# Patient Record
Sex: MALE
[De-identification: names, ages, dates, MRNs, and addresses within clinical notes are randomized per-mention and may not be internally consistent; named-entity substitution may affect disease eponyms.]

---

## 2017-12-13 ENCOUNTER — HOSPITAL ENCOUNTER (INPATIENT)
Dept: HOSPITAL 31 - C.ER | Age: 52
LOS: 2 days | Discharge: HOME | DRG: 895 | End: 2017-12-15
Attending: PSYCHIATRY & NEUROLOGY | Admitting: PSYCHIATRY & NEUROLOGY
Payer: COMMERCIAL

## 2017-12-13 DIAGNOSIS — F41.9: ICD-10-CM

## 2017-12-13 DIAGNOSIS — F10.230: ICD-10-CM

## 2017-12-13 DIAGNOSIS — F17.210: ICD-10-CM

## 2017-12-13 DIAGNOSIS — F14.11: ICD-10-CM

## 2017-12-13 DIAGNOSIS — F11.23: Primary | ICD-10-CM

## 2017-12-13 LAB
ALBUMIN/GLOB SERPL: 1.5 {RATIO} (ref 1–2.1)
ALP SERPL-CCNC: 56 U/L (ref 38–126)
ALT SERPL-CCNC: 235 U/L (ref 21–72)
AST SERPL-CCNC: 122 U/L (ref 17–59)
BASOPHILS # BLD AUTO: 0.1 K/UL (ref 0–0.2)
BASOPHILS NFR BLD: 1.1 % (ref 0–2)
BILIRUB SERPL-MCNC: 0.6 MG/DL (ref 0.2–1.3)
BILIRUB UR-MCNC: NEGATIVE MG/DL
BUN SERPL-MCNC: 9 MG/DL (ref 9–20)
CALCIUM SERPL-MCNC: 8.2 MG/DL (ref 8.6–10.4)
CHLORIDE SERPL-SCNC: 104 MMOL/L (ref 98–107)
CO2 SERPL-SCNC: 29 MMOL/L (ref 22–30)
EOSINOPHIL # BLD AUTO: 0.5 K/UL (ref 0–0.7)
EOSINOPHIL NFR BLD: 3.6 % (ref 0–4)
ERYTHROCYTE [DISTWIDTH] IN BLOOD BY AUTOMATED COUNT: 13.4 % (ref 11.5–14.5)
ETHANOL SERPL-MCNC: 78 MG/DL (ref 0–10)
GLOBULIN SER-MCNC: 3 GM/DL (ref 2.2–3.9)
GLUCOSE SERPL-MCNC: 131 MG/DL (ref 75–110)
GLUCOSE UR STRIP-MCNC: NORMAL MG/DL
HCT VFR BLD CALC: 42.9 % (ref 35–51)
KETONES UR STRIP-MCNC: NEGATIVE MG/DL
LEUKOCYTE ESTERASE UR-ACNC: (no result) LEU/UL
LYMPHOCYTES # BLD AUTO: 4 K/UL (ref 1–4.3)
LYMPHOCYTES NFR BLD AUTO: 30.8 % (ref 20–40)
MCH RBC QN AUTO: 33.3 PG (ref 27–31)
MCHC RBC AUTO-ENTMCNC: 33.3 G/DL (ref 33–37)
MCV RBC AUTO: 100 FL (ref 80–94)
MONOCYTES # BLD: 1.1 K/UL (ref 0–0.8)
MONOCYTES NFR BLD: 8.6 % (ref 0–10)
NRBC BLD AUTO-RTO: 0 % (ref 0–2)
PH UR STRIP: 5 [PH] (ref 5–8)
PLATELET # BLD: 217 K/UL (ref 130–400)
PMV BLD AUTO: 8.8 FL (ref 7.2–11.7)
POTASSIUM SERPL-SCNC: 4 MMOL/L (ref 3.6–5.2)
PROT SERPL-MCNC: 7.4 G/DL (ref 6.3–8.3)
PROT UR STRIP-MCNC: NEGATIVE MG/DL
RBC # UR STRIP: NEGATIVE /UL
RBC #/AREA URNS HPF: < 1 /HPF (ref 0–3)
SODIUM SERPL-SCNC: 142 MMOL/L (ref 132–148)
SP GR UR STRIP: 1.02 (ref 1–1.03)
UROBILINOGEN UR-MCNC: NORMAL MG/DL (ref 0.2–1)
WBC # BLD AUTO: 12.9 K/UL (ref 4.8–10.8)
WBC #/AREA URNS HPF: 1 /HPF (ref 0–5)

## 2017-12-13 PROCEDURE — HZ56ZZZ INDIVIDUAL PSYCHOTHERAPY FOR SUBSTANCE ABUSE TREATMENT, PSYCHOEDUCATION: ICD-10-PCS | Performed by: PSYCHIATRY & NEUROLOGY

## 2017-12-13 PROCEDURE — HZ59ZZZ INDIVIDUAL PSYCHOTHERAPY FOR SUBSTANCE ABUSE TREATMENT, SUPPORTIVE: ICD-10-PCS | Performed by: PSYCHIATRY & NEUROLOGY

## 2017-12-13 PROCEDURE — HZ52ZZZ INDIVIDUAL PSYCHOTHERAPY FOR SUBSTANCE ABUSE TREATMENT, COGNITIVE-BEHAVIORAL: ICD-10-PCS | Performed by: PSYCHIATRY & NEUROLOGY

## 2017-12-13 PROCEDURE — HZ2ZZZZ DETOXIFICATION SERVICES FOR SUBSTANCE ABUSE TREATMENT: ICD-10-PCS | Performed by: PSYCHIATRY & NEUROLOGY

## 2017-12-13 NOTE — C.PDOC
History Of Present Illness


The patient presents to the ED requesting heroin and alcohol detox. Patient 

states his last heroin use was this morning and last drink was at around 1700 

today. Patient denies suicidal/homicidal ideation at this time. 


Time Seen by Provider: 12/13/17 19:24


Chief Complaint (Nursing): Substance Abuse


History Per: Patient


History/Exam Limitations: no limitations


Onset/Duration Of Symptoms: Hrs


Current Symptoms Are (Timing): Still Present


Suicide/Self Injury Attempted (Context): None


Modifying Factor(s): Alcohol, Narcotics (heroin)


Severity: Mild


Pain Scale Rating Of: 2


Associated Symptoms: denies: Suicidal Thoughts, Suicidal Plan


Involuntary Hold By: None


Recent travel outside of the [place]: No


Additional History Per: Patient





Past Medical History


Reviewed: Historical Data, Nursing Documentation, Vital Signs


Vital Signs: 


 Last Vital Signs











Temp  97.6 F   12/13/17 19:17


 


Pulse  98 H  12/13/17 19:17


 


Resp  16   12/13/17 19:17


 


BP  117/83   12/13/17 19:17


 


Pulse Ox  97   12/13/17 19:51














- Medical History


PMH: Anxiety, Back Problems


Surgical History: No Surg Hx


Family History: States: Unknown Family Hx





- Social History


Hx Tobacco Use: Yes


Hx Alcohol Use: Yes


Hx Substance Use: Yes (Heroin, Methadone)





- Immunization History


Hx Tetanus Toxoid Vaccination: Yes


Hx Influenza Vaccination: No


Hx Pneumococcal Vaccination: No





Review Of Systems


Constitutional: Negative for: Fever, Chills


Cardiovascular: Negative for: Chest Pain, Palpitations


Respiratory: Negative for: Shortness of Breath


Gastrointestinal: Negative for: Nausea, Vomiting, Abdominal Pain


Skin: Negative for: Rash, Lesions, Jaundice, Bruising


Neurological: Negative for: Weakness, Numbness


Psych: Negative for: Suicidal ideation





Physical Exam





- Physical Exam


Appears: Non-toxic, No Acute Distress


Skin: Warm, Dry


Head: Normacephalic


Eye(s): bilateral: Normal Inspection


Oral Mucosa: Moist


Neck: Supple


Chest: Symmetrical, No Deformity, No Tenderness


Cardiovascular: Rhythm Regular, No Murmur


Respiratory: No Rales, No Rhonchi, No Wheezing


Back: Normal Inspection


Extremity: Normal ROM


Extremity: Bilateral: Atraumatic


Neurological/Psych: Oriented x3


Gait: Steady





ED Course And Treatment





- Laboratory Results


Result Diagrams: 


 12/13/17 19:34





 12/13/17 19:34


O2 Sat by Pulse Oximetry: 97 (on RA)


Pulse Ox Interpretation: Normal


Progress Note: Bloodwork and urinalysis ordered.





Disposition


Discussed With Dr.: Alisha Carson


Comment: accepted the pt on his service and took over the care at 9:07 PM


Doctor Will See Patient In The: Hospital


Counseled Patient/Family Regarding: Studies Performed, Diagnosis





- Disposition


Disposition: HOSPITALIZED


Disposition Time: 19:24


Condition: FAIR


Forms:  [facility] (English)





- POA


Present On Arrival: None





- Clinical Impression


Clinical Impression: 


 Alcohol abuse, Drug dependence








- Scribe Statement


The provider has reviewed the documentation as recorded by the Scribe (Nani Luna)


Provider Attestation: 








All medical record entries made by the Scribe were at my direction and 

personally dictated by me. I have reviewed the chart and agree that the record 

accurately reflects my personal performance of the history, physical exam, 

medical decision making, and the department course for this patient. I have 

also personally directed, reviewed, and agree with the discharge instructions 

and disposition.





Decision To Admit





- Pt Status Changed To:


Hospital Disposition Of: Inpatient





- Admit Certification


Admit to Inpatient:: After my assessment, the patient will require 

hospitalization for at least two midnights.  This is because of the severity of 

symptoms shown, intensity of services needed, and/or the medical risk in this 

patient being treated as an outpatient.





- InPatient:


Physician Admission Certification: I certify that this patient requires 2 or 

more midnights of care for the following reason:: After my assessment, the 

patient will require hospitalization for at least two midnights.  This is 

because of the severity of symptoms shown, intensity of services needed, and/or 

the medical risk in this patient being treated as an outpatient.





- .


Bed Request Type: Detox


Admitting Physician: Alisha Carson


Patient Diagnosis: 


 Alcohol abuse, Drug dependence

## 2017-12-14 VITALS — RESPIRATION RATE: 18 BRPM

## 2017-12-14 RX ADMIN — Medication SCH: at 09:27

## 2017-12-14 NOTE — PCM.PSYCH
Initial Psychiatric Evaluation





- Initial Psychiatric Evaluation


Type of Admission: Voluntary


Legal Status: Capacity


Chief Complaint (in patient's own words): 





"I had to stop this"


History of Present Illness and Precipitating Events: 





The patient is seen, chart reviewed and case discussed.


This is a 52-year-old  male, single with no child, unemployed last 7 

months, he gets unemployment money at this point.





The patient drinks 2 packs of beer and a pint of liquor for the last 4 years.


Denies DTs or seizures


He also uses up to 5 bags of heroin "on and off" intranasally for the last 32 

years.


He says he tried methadone in the past and recently to come off he took some 

"sips of methadone"


He was on 90 mg of methadone at [facility] up until 4 years ago.


He was using cocaine but stopped recently


Smokes half pack per day cigarettes


Denies all other drugs.


He was in detox 7 times and rehabilitation 2 times.





Past psych history: He states he had anxiety all his life and still suffers 

from anxiety. No admissions or suicide attempts





Family psych history: Mother had anxiety and depression





Medical history: Denies


Current Medications: 





Active Medications











Generic Name Dose Route Start Last Admin





  Trade Name Freq  PRN Reason Stop Dose Admin


 


Acetaminophen  650 mg  12/13/17 21:33  





  Tylenol 325mg Tab  PO   





  Q4H PRN   





  Fever greater than 101 F   


 


Al Hydrox/Mg Hydrox/Simethicone  30 ml  12/13/17 21:33  





  Maalox 30 Ml  PO   





  TID PRN   





  Indigestion / Heartburn   


 


Clonidine HCl  0.1 mg  12/13/17 21:33  12/13/17 22:56





  Catapres  PO   0.1 mg





  Q8 PRN   Administration





  COWS Score More or Equal to 5   


 


Folic Acid  1 mg  12/14/17 10:00  12/14/17 09:27





  Folic Acid  PO   Not Given





  DAILY MARIA ELENA   


 


Gabapentin  100 mg  12/14/17 10:00  12/14/17 09:28





  Neurontin  PO   Not Given





  TID MARIA ELENA   


 


Hydroxyzine HCl  25 mg  12/13/17 22:26  12/13/17 22:57





  Atarax  PO   25 mg





  Q6 PRN   Administration





  Anxiety   


 


Loperamide HCl  2 mg  12/13/17 21:33  





  Imodium  PO   





  Q8 PRN   





  Diarrhea   


 


Lorazepam  1 mg  12/13/17 21:45  12/14/17 12:35





  Ativan  PO  12/18/17 21:44  1 mg





  Q4 MARIA ELENA   Administration





  Taper   


 


Lorazepam  1 mg  12/13/17 21:34  





  Ativan  PO   





  Q4H PRN   





  Symptoms of alcohol withdrawl   


 


Multivitamins  1 tab  12/14/17 10:00  12/14/17 09:27





  Hexavitamin  PO   Not Given





  DAILY MARIA ELENA   


 


Ondansetron HCl  4 mg  12/13/17 21:33  





  Zofran Tab  PO   





  Q8 PRN   





  Nausea/Vomiting   


 


Thiamine HCl  100 mg  12/14/17 10:00  12/14/17 09:28





  Vitamin B1 Tab  PO   Not Given





  DAILY MARIA ELENA   


 


Trazodone HCl  50 mg  12/13/17 21:34  12/13/17 22:57





  Desyrel  PO   50 mg





  HS PRN   Administration





  Insomnia   














Past Psychiatric History





- Past Psychiatric History


Previous Treatment History: None


Pertinent Medical Hx (Current Medical&Sleep Prob, Allergies): 





 Allergies











Allergy/AdvReac Type Severity Reaction Status Date / Time


 


No Known Allergies Allergy   Verified 12/13/17 19:15








 





No Known Home Med  12/13/17 











Review of Systems





- Neurological


Neurological: UNREMARKABLE





- Psychiatric


Psychiatric: Abnormal Sleep Pattern, Anxiety.  absent: Depression, Difficulty 

Concentrating, Hallucinations, Suicidal Ideation





Mental Status Examination





- Personal Presentation


Personal Presentation: Looks stated age





- Affect


Affect: Constricted





- Motor Activity


Motor Activity: Calm





- Reliability in Providing Information


Reliability in Providing Information: Good





- Speech


Speech: Organized





- Mood


Mood: Anxious





- Formal Thought Process


Formal Thought Process: No Impairment





- Cognitive Functions


Orientation: Person, Place, Situation, Time


Attention/Concentration: Attentive


Estimate of Intelligence: Average


Judgement: Intact, as evidence by: Insight regarding need for hospitalization


Memory: Recent intact, as evidence by: Ability to recall events of the day, 

Remote intact, as evidenced by: Abilit to recall sig. life events





- Risk


Risk: Withdrawal, Diminished functioning





- Strength & Assets Inventory


Strength & Assets Inventory: Cooperative





DSM 5 DX





- DSM 5


DSM 5 Diagnosis: 





Opioid withdrawal


Opioid use d/o - severe


Alcohol use d/o -severe


Alcohol withdrawal


Cocaine use d/o- in early remission


Tobacco use d/o - moderate


Anxiety d/o - unspecified





- Recommended/Plan of Treatment


Treatment Recommendations and Plan of Treatment: 





Ativan detox due to elevated LFTs


Subutex detox if he scores higher


Gabapentin for augmentation


As needed medications


Attend groups and activities


Supportive therapy and psychoeducation


MI for abstinence


CBT for relapse prevention


Encourage MAT


Refer to rehab or IOP, and self-help groups


Smoking cessation with MI


Nicotine patch


34 min


Projected ELOS: 5 days


Prognosis: good w treatment





- Smoking Cessation


Smoking Cessation Initiated: Yes

## 2017-12-14 NOTE — PCM.BM
<OsmaniThaoFarida F - Last Filed: 12/14/17 14:54>





Treatment Plan Problems





- Problems identified on initial assessmt


  ** Risk for alcohol use disorder


Date Initiated: 12/13/17


Time Initiated: 22:00


Assessment reference: NA


Status: Active





  ** Risk for opioid use disorder


Date Initiated: 12/13/17


Time Initiated: 22:00


Assessment reference: NA


Status: Active





Treatment assets and liabiliti


Patient Assests: self-reliant, ADL independent, negotiates basic needs, 

cognitively intact


Patient Liabilities: financial problems, relationship conflicts, substance abuse





- Milieu Protocol


Maintain good personal hygiene: daily Encourage regular showers, daily Remind 

patient to perform daily oral care, daily Assist patient to perform ADL's, 

every shift Encourage regular showers, every shift Remind patient to perform 

daily oral care, every shift Assist patient to perform ADL's


Maintain personal safety: daily Educate patient to report safety concerns to 

staff, daily Monitor environment for contraband/sharps, every shift Educate 

patient to report safety concerns to staff, every shift Monitor environment for 

contraband/sharps


Medication safety: Monitor for expected outcome, potential side effects: daily, 

every shift, Assess barriers to learning: daily, every shift, Assess readiness 

for medication education: daily, every shift


Milieu Narrative: 





Ativan detox due to elevated LFTs


Subutex detox if he scores higher


Gabapentin for augmentation


As needed medications


Attend groups and activities


Supportive therapy and psychoeducation


MI for abstinence


CBT for relapse prevention


Encourage MAT


Refer to rehab or IOP, and self-help groups


Smoking cessation with MI


Nicotine patch


34 min





Family Contact


Family involvement: Famliy/SO not involved





Discharge/Continuing Care





- Treatment Team Participation


Patient/Family/SO Statement: 





Ativan detox due to elevated LFTs


Subutex detox if he scores higher


Gabapentin for augmentation


As needed medications


Attend groups and activities


Supportive therapy and psychoeducation


MI for abstinence


CBT for relapse prevention


Encourage MAT


Refer to rehab or IOP, and self-help groups


Smoking cessation with MI


Nicotine patch


34 min





<David Bustillo - Last Filed: 12/15/17 10:32>





- Diagnosis


(1) Alcohol use disorder, severe, dependence


Status: Acute   


Interventions: 





12/15/17 10:32


* Assess 7x/week regarding severity of withdrawal


* Educate regarding risks, benefits, side effects and alternatives of 

medications


* Use Motivational Interviewing for abstinence


* Use CBT for relapse prevention


* Medication management for withdrawal symptoms


* Encourage medication assisted treatment


*

## 2017-12-15 VITALS
HEART RATE: 108 BPM | TEMPERATURE: 97.9 F | DIASTOLIC BLOOD PRESSURE: 91 MMHG | SYSTOLIC BLOOD PRESSURE: 121 MMHG | OXYGEN SATURATION: 98 %

## 2017-12-15 RX ADMIN — Medication SCH: at 10:25

## 2017-12-15 NOTE — PCM.PYCHDC
Mental Status Examination





- Mental Status Examination


Orientation: Person, Place, Situation, Time


Memory: Intact


Mood: Anxious


Affect: Constricted


Speech: Appropriate


Attention: Poor


Concentration: Poor


Association: WNL


Fund of Knowledge: WNL


Formal Thought Process: No Impairment


Suicidal Ideation: No


Current Homicidal Ideation?: No





Discharge Summary





- Discharge Note


Consultations:: List each consultation separately and include:  1. Reason for 

request.  2. Findings.  3. Follow-up


Summary of Hospital Course include:: 1. Description of specific treatment plan 

utilized for patients during their course of treatmen.  2. Summarize the time-

course for resolution of acute symptoms and/or regressed behaviors.  3. 

Describe issues identified and worked on during hospitalization.  4. Describe 

medication utilized.  5. Describe medical problems identified and treated.  6. 

Reassessment of suicide risk


Summary of Hospital Course: 





The patient is seen, chart reviewed and case discussed.





On admission:


This is a 52-year-old  male, single with no child, unemployed last 7 

months, he gets unemployment money at this point.





The patient drinks 2 packs of beer and a pint of liquor for the last 4 years.


Denies DTs or seizures


He also uses up to 5 bags of heroin "on and off" intranasally for the last 32 

years.


He says he tried methadone in the past and recently to come off he took some 

"sips of methadone"


He was on 90 mg of methadone at [facility] up until 4 years ago.


He was using cocaine but stopped recently


Smokes half pack per day cigarettes


Denies all other drugs.


He was in detox 7 times and rehabilitation 2 times.





Past psych history: He states he had anxiety all his life and still suffers 

from anxiety. No admissions or suicide attempts





Family psych history: Mother had anxiety and depression


Medical history: Denies





Hospital course:


The pt was admitted and started on treatment with psychotherapy, support, 

psychoeducation and medications. 


MI and CBT used briefly.


All the risks and benefits of medications are discussed and the patient 

understood and agreed.


The pt was found to be smoking the next morning and discharged 

administratively. He first denied it but then his room-mate who smoked together 

admitted. They even pried open the room window a little.


He is given recommendations about how to stay clean and go to [facility] (or 

other rehabs)


He refused prescriptions (and doesn't have insurance)








- Final Diagnosis (DSM 5)


Condition upon Discharge: FAIR


DSM 5: 





Opioid withdrawal


Opioid use d/o - severe


Alcohol use d/o -severe


Alcohol withdrawal


Cocaine use d/o- in early remission


Tobacco use d/o - moderate


Anxiety d/o - unspecified


Disposition: HOME/ ROUTINE


Follow-up Treatment Plan: 





Follow after care plan as discussed.


Use relapse prevention skills


Return to ER or call 911 if suicidal, homicidal or symptoms relapse.


Stay away from stress, alcohol and drugs.


See primary doctor regularly and get labs.

## 2018-10-03 ENCOUNTER — HOSPITAL ENCOUNTER (OUTPATIENT)
Dept: HOSPITAL 31 - C.ER | Age: 53
Setting detail: OBSERVATION
LOS: 2 days | Discharge: LEFT BEFORE DISCHARGE | End: 2018-10-05
Attending: INTERNAL MEDICINE | Admitting: INTERNAL MEDICINE
Payer: COMMERCIAL

## 2018-10-03 VITALS — BODY MASS INDEX: 23.6 KG/M2

## 2018-10-03 DIAGNOSIS — E86.0: ICD-10-CM

## 2018-10-03 DIAGNOSIS — K56.7: Primary | ICD-10-CM

## 2018-10-03 DIAGNOSIS — F17.200: ICD-10-CM

## 2018-10-03 DIAGNOSIS — Z53.21: ICD-10-CM

## 2018-10-03 LAB
ALBUMIN SERPL-MCNC: 4.5 G/DL
ALBUMIN/GLOB SERPL: 1.4 {RATIO}
ALT SERPL-CCNC: 24 U/L
AST SERPL-CCNC: 33 U/L
BASOPHILS # BLD AUTO: 0.1 K/UL
BASOPHILS NFR BLD: 1.3 %
BILIRUB UR-MCNC: NEGATIVE MG/DL
BUN SERPL-MCNC: 12 MG/DL
CALCIUM SERPL-MCNC: 9.1 MG/DL
EOSINOPHIL # BLD AUTO: 0.3 K/UL
EOSINOPHIL NFR BLD: 3 %
ERYTHROCYTE [DISTWIDTH] IN BLOOD BY AUTOMATED COUNT: 13.9 %
GFR NON-AFRICAN AMERICAN: > 60
GLUCOSE UR STRIP-MCNC: NORMAL MG/DL
HGB BLD-MCNC: 14.7 G/DL
LEUKOCYTE ESTERASE UR-ACNC: (no result) LEU/UL
LIPASE: 142 U/L
LYMPHOCYTES # BLD AUTO: 3.9 K/UL
LYMPHOCYTES NFR BLD AUTO: 34.3 %
MCH RBC QN AUTO: 33.4 PG
MCHC RBC AUTO-ENTMCNC: 33.7 G/DL
MCV RBC AUTO: 99.3 FL
MONOCYTES # BLD: 1 K/UL
MONOCYTES NFR BLD: 8.3 %
NEUTROPHILS # BLD: 6.1 K/UL
NEUTROPHILS NFR BLD AUTO: 53.1 %
NRBC BLD AUTO-RTO: 0.1 %
PH UR STRIP: 5 [PH]
PLATELET # BLD: 264 K/UL
PMV BLD AUTO: 8.6 FL
PROT UR STRIP-MCNC: NEGATIVE MG/DL
RBC # BLD AUTO: 4.39 MIL/UL
RBC # UR STRIP: NEGATIVE /UL
SP GR UR STRIP: 1.02
UROBILINOGEN UR-MCNC: NORMAL MG/DL
WBC # BLD AUTO: 11.5 K/UL

## 2018-10-03 PROCEDURE — 99285 EMERGENCY DEPT VISIT HI MDM: CPT

## 2018-10-03 PROCEDURE — 80353 DRUG SCREENING COCAINE: CPT

## 2018-10-03 PROCEDURE — 81001 URINALYSIS AUTO W/SCOPE: CPT

## 2018-10-03 PROCEDURE — 80053 COMPREHEN METABOLIC PANEL: CPT

## 2018-10-03 PROCEDURE — 80320 DRUG SCREEN QUANTALCOHOLS: CPT

## 2018-10-03 PROCEDURE — 36415 COLL VENOUS BLD VENIPUNCTURE: CPT

## 2018-10-03 PROCEDURE — 96365 THER/PROPH/DIAG IV INF INIT: CPT

## 2018-10-03 PROCEDURE — 83690 ASSAY OF LIPASE: CPT

## 2018-10-03 PROCEDURE — 80361 OPIATES 1 OR MORE: CPT

## 2018-10-03 PROCEDURE — 80345 DRUG SCREENING BARBITURATES: CPT

## 2018-10-03 PROCEDURE — 96366 THER/PROPH/DIAG IV INF ADDON: CPT

## 2018-10-03 PROCEDURE — 80358 DRUG SCREENING METHADONE: CPT

## 2018-10-03 PROCEDURE — 96361 HYDRATE IV INFUSION ADD-ON: CPT

## 2018-10-03 PROCEDURE — 80349 CANNABINOIDS NATURAL: CPT

## 2018-10-03 PROCEDURE — 96375 TX/PRO/DX INJ NEW DRUG ADDON: CPT

## 2018-10-03 PROCEDURE — 85025 COMPLETE CBC W/AUTO DIFF WBC: CPT

## 2018-10-03 PROCEDURE — 80346 BENZODIAZEPINES1-12: CPT

## 2018-10-03 PROCEDURE — 83992 ASSAY FOR PHENCYCLIDINE: CPT

## 2018-10-03 PROCEDURE — 96372 THER/PROPH/DIAG INJ SC/IM: CPT

## 2018-10-03 PROCEDURE — 74177 CT ABD & PELVIS W/CONTRAST: CPT

## 2018-10-03 PROCEDURE — 96376 TX/PRO/DX INJ SAME DRUG ADON: CPT

## 2018-10-03 PROCEDURE — 80324 DRUG SCREEN AMPHETAMINES 1/2: CPT

## 2018-10-03 NOTE — C.PDOC
History Of Present Illness


53 year old male presents to the ED c/o right flank pain that started tonight. 

Patient states his pain is persistent and does not change with movement or deep 

breathing. Patient denies fever, chills, nausea, vomit, diarrhea, dysuria, 

hematuria. 


Chief Complaint (Nursing): Abdominal Pain


History Per: Patient


History/Exam Limitations: no limitations


Onset/Duration Of Symptoms: Hrs


Current Symptoms Are (Timing): Still Present


Location Of Pain/Discomfort: Diffuse


Radiation Of Pain To:: Flank


Quality Of Discomfort: "Pain"


Associated Symptoms: denies: Nausea, Vomiting, Diarrhea, Urinary Symptoms


Exacerbating Factors: None


Alleviating Factors: None


Recent travel outside of the [place]: No


Additional History Per: Patient





Past Medical History


Reviewed: Historical Data, Nursing Documentation, Vital Signs


Vital Signs: 





                                Last Vital Signs











Temp  97.7 F   10/03/18 20:56


 


Pulse  99 H  10/03/18 20:56


 


Resp  16   10/03/18 20:56


 


BP  131/85   10/03/18 20:56


 


Pulse Ox  98   10/03/18 20:56














- Medical History


PMH: Anxiety, Back Problems


   Denies: Diabetes, Hepatitis, HIV, HTN, Seizures, Sexually Transmitted Disease


Surgical History: No Surg Hx





- [facility] Procedures











DETOXIFICATION SERVICES FOR SUBSTANCE ABUSE TREATMENT (12/13/17)


[facility]IV PSYCHOTHERAPY FOR SUBSTANCE ABUSE TREATMENT, SUPPORT (12/13/17)


[facility] PSYCHOTHERAPY FOR SUBSTANCE ABUSE, COGNITIV BEHAVIORAL (12/13/17)


[facility] PSYCHOTHERAPY FOR SUBSTANCE ABUSE, PSYCHOEDUCATION (12/13/17)








Family History: States: Unknown Family Hx





- Social History


Hx Tobacco Use: Yes


Hx Alcohol Use: Yes


Hx Substance Use: Yes





- Immunization History


Hx Tetanus Toxoid Vaccination: Yes


Hx Influenza Vaccination: No


Hx Pneumococcal Vaccination: No





Review Of Systems


Constitutional: Negative for: Fever, Chills


Cardiovascular: Negative for: Chest Pain


Respiratory: Negative for: Shortness of Breath


Gastrointestinal: Negative for: Nausea, Vomiting


Genitourinary: Negative for: Dysuria, Hematuria


Musculoskeletal: Positive for: Back Pain


Neurological: Negative for: Weakness, Numbness





Physical Exam





- Physical Exam


Appears: Non-toxic, No Acute Distress


Skin: Normal Color, Warm, Dry


Head: Atraumatic, Normacephalic


Eye(s): bilateral: Normal Inspection


Oral Mucosa: Moist


Neck: Normal ROM, Supple


Chest: Symmetrical


Cardiovascular: Rhythm Regular


Respiratory: Normal Breath Sounds, No Rales, No Rhonchi, No Wheezing


Gastrointestinal/Abdominal: Soft, No Tenderness, No Guarding, No Rebound


Back: CVA Tenderness (right)


Extremity: Normal ROM, No Tenderness, No Swelling


Neurological/Psych: Oriented x3, Normal Speech, Normal Cognition


Gait: Steady





ED Course And Treatment





- Laboratory Results


Result Diagrams: 


                                 10/03/18 22:33





                                 10/03/18 22:33


O2 Sat by Pulse Oximetry: 98 (ON RA)


Pulse Ox Interpretation: Normal





- CT Scan/US


  ** CT abd/pelvis


Other Rad Studies (CT/US): Read By Radiologist, Radiology Report Reviewed


CT/US Interpretation: CT SCAN OF THE ABDOMEN AND PELVIS WITH CONTRAST.  CLINICAL

HISTORY: Abdominal pain.  TECHNIQUE: Multiple axial and coronal CT images were 

obtained through the abdomen and pelvis after administration of intravenous 

contrast material.  COMMENTS:  Fluid filled the small bowels in the left lower 

quadrant.  The liver is of uniform attenuation without mass or defect. There is 

no intra or extrahepatic biliary ductal dilatation. The spleen is normal. The 

gallbladder is within normal limits. The pancreas is of normal contour and 

attenuation characteristics. There is no evidence of adrenal mass.  

Uncomplicated colonic diverticulosis.  Both kidneys demonstrate prompt and equal

nephrograms. The kidneys are normal in size, shape and configuration. There is 

no evidence of renal or ureteral mass. No renal or ureteral calculi are 

identified. There is no hydroureter or hydronephrosis.  No evidence for 

appendicitis. There is no bowel wall thickening. No evidence for small or large 

bowel obstruction. There is no evidence of abdominal ascites or lymphadenopathy.

 There is no evidence of intrinsic or extrinsic bladder mass. There is no pelvic

ascites or lymphadenopathy.  Images of the lung bases show no evidence of 

pleural or parenchymal mass. There are no pleural effusions. The bony structures

are free of lytic or blastic lesions.  IMPRESSION:  Fluid filled small bowel to 

the left lower quadrant. Nonspecific finding. Probably mild ileus.  Thank you 

for your kind referral of this patient.  .  Electronically signed on Oct 4, 2018

12:42:51 AM EDT by:  Ale Wynn M.D., Certified by ABR, MSK, 

Neuroradiology





Medical Decision Making


Medical Decision Making: 


Plan:


* CT abd/pelvis


* Labs


* IV fluids


* Toradol 30 mg IVP


* UA








Disposition


Discussed With : Aba Baird


Doctor Will See Patient In The: Hospital


Counseled Patient/Family Regarding: Diagnosis





- Disposition


Disposition: HOSPITALIZED


Disposition Time: 01:21


Condition: STABLE


Forms:  [facility] (English)





- POA


Present On Arrival: None





- Clinical Impression


Clinical Impression: 


 Abdominal pain, Ileus








- Scribe Statement


The provider has reviewed the documentation as recorded by the Scribe


Gold [place]





All medical record entries made by the Scribe were at my direction and 

personally dictated by me. I have reviewed the chart and agree that the record 

accurately reflects my personal performance of the history, physical exam, me

dical decision making, and the department course for this patient. I have also 

personally directed, reviewed, and agree with the discharge instructions and 

disposition.

## 2018-10-04 VITALS — RESPIRATION RATE: 20 BRPM

## 2018-10-04 RX ADMIN — WATER SCH MLS/HR: 1 INJECTION INTRAMUSCULAR; INTRAVENOUS; SUBCUTANEOUS at 14:12

## 2018-10-04 RX ADMIN — WATER SCH MLS/HR: 1 INJECTION INTRAMUSCULAR; INTRAVENOUS; SUBCUTANEOUS at 06:11

## 2018-10-04 RX ADMIN — DEXTROSE AND SODIUM CHLORIDE SCH: 5; 450 INJECTION, SOLUTION INTRAVENOUS at 13:30

## 2018-10-04 RX ADMIN — DEXTROSE AND SODIUM CHLORIDE SCH MLS/HR: 5; 450 INJECTION, SOLUTION INTRAVENOUS at 02:15

## 2018-10-04 RX ADMIN — DEXTROSE AND SODIUM CHLORIDE SCH MLS/HR: 5; 450 INJECTION, SOLUTION INTRAVENOUS at 06:18

## 2018-10-04 RX ADMIN — WATER SCH MLS/HR: 1 INJECTION INTRAMUSCULAR; INTRAVENOUS; SUBCUTANEOUS at 21:52

## 2018-10-04 NOTE — CT
Date of service: 



10/03/2018



PROCEDURE:  CT Abdomen and Pelvis with contrast



HISTORY:

left flank pain



COMPARISON:

None.



TECHNIQUE:

Intravenous contrast dose: 100 cc Visipaque 320.



Radiation dose:



Total exam DLP = 333.01 mGy-cm.



This CT exam was performed using one or more of the following dose 

reduction techniques: Automated exposure control, adjustment of the 

mA and/or kV according to patient size, and/or use of iterative 

reconstruction technique.



FINDINGS:



LOWER THORAX:

Unremarkable. 



LIVER:

Unremarkable. No gross lesion or ductal dilatation. 



GALLBLADDER AND BILE DUCTS:

Unremarkable. 



PANCREAS:

Unremarkable. No gross lesion or ductal dilatation.



SPLEEN:

Unremarkable. 



ADRENALS:

Unremarkable. No mass. 



KIDNEYS AND URETERS:

Unremarkable. No hydronephrosis. No solid mass. 



VASCULATURE:

Unremarkable. No aortic aneurysm. 



BOWEL:

Unremarkable. No obstruction. No gross mural thickening. 



APPENDIX:

No abnormalities to suggest acute appendicitis. No right lower 

quadrant inflammatory processes identified. 



PERITONEUM:

Unremarkable. No free fluid. No free air. 



LYMPH NODES:

Unremarkable. No enlarged lymph nodes. 



BLADDER:

Unremarkable. 



REPRODUCTIVE:

Unremarkable. 



BONES:

No acute fracture. 



OTHER FINDINGS:

None.



IMPRESSION:

No acute findings related to/accounting  for the clinical 

presentation.



Additional benign and/or incidental findings described above.



________________________________________________



Concordant results (preliminary interpretation) provided by [facility].



Procedure Completed: 23:54



Preliminary Report: Dictated and Authenticated: 12:42.



Final Interpretation: 10:04. October 3, 2018

## 2018-10-04 NOTE — CP.PCM.HP
Past Patient History





- Infectious Disease


Hx of Infectious Diseases: None





- Past Medical History & Family History


Past Medical History?: No





- Past Social History


Smoking Status: Heavy Smoker > 10 Cigarettes Daily





- CARDIAC


Hx Hypertension: No





- PULMONARY


Hx Tuberculosis: No





- NEUROLOGICAL


Hx Seizures: No





- ENDOCRINE/METABOLIC


Hx Endocrine Disorders: No





- HEMATOLOGICAL/ONCOLOGICAL


Hx Human Immunodeficiency Virus (HIV): No





- MUSCULOSKELETAL/RHEUMATOLOGICAL


Hx Falls: No





- GENITOURINARY/GYNECOLOGICAL


Hx Sexually Transmitted Disorders: No





- PSYCHIATRIC


Hx Anxiety: Yes


Hx Substance Use: Yes





- SURGICAL HISTORY


Hx Surgeries: Yes


Hx Musculoskeletal Surgery: Yes


Other/Comment: knee sx, torn cartilage





- ANESTHESIA


Hx Anesthesia: Yes


Hx Anesthesia Reactions: No





Meds


Allergies/Adverse Reactions: 


                                    Allergies











Allergy/AdvReac Type Severity Reaction Status Date / Time


 


No Known Allergies Allergy   Verified 12/13/17 19:15














Results





- Vital Signs


Recent Vital Signs: 





                                Last Vital Signs











Temp  97.2 F L  10/04/18 15:00


 


Pulse  82   10/04/18 15:00


 


Resp  20   10/04/18 15:00


 


BP  139/88   10/04/18 15:00


 


Pulse Ox  96   10/04/18 15:00














- Labs


Result Diagrams: 


                                 10/03/18 22:33





                                 10/03/18 22:33


Labs: 





                         Laboratory Results - last 24 hr











  10/03/18 10/03/18 10/03/18





  22:33 22:33 22:33


 


WBC  11.5 H  


 


RBC  4.39 L  


 


Hgb  14.7  


 


Hct  43.5  


 


MCV  99.3 H  


 


MCH  33.4 H  


 


MCHC  33.7  


 


RDW  13.9  


 


Plt Count  264  


 


MPV  8.6  


 


Neut % (Auto)  53.1  


 


Lymph % (Auto)  34.3  


 


Mono % (Auto)  8.3  


 


Eos % (Auto)  3.0  


 


Baso % (Auto)  1.3  


 


Neut # (Auto)  6.1  


 


Lymph # (Auto)  3.9  


 


Mono # (Auto)  1.0 H  


 


Eos # (Auto)  0.3  


 


Baso # (Auto)  0.1  


 


Sodium    145


 


Potassium    3.9


 


Chloride    107


 


Carbon Dioxide    26


 


Anion Gap    17


 


BUN    12


 


Creatinine    0.7 L


 


Est GFR ( Amer)    > 60


 


Est GFR (Non-Af Amer)    > 60


 


Random Glucose    94


 


Calcium    9.1


 


Total Bilirubin    0.5


 


AST    33


 


ALT    24


 


Alkaline Phosphatase    77


 


Total Protein    7.8


 


Albumin    4.5


 


Globulin    3.3


 


Albumin/Globulin Ratio    1.4


 


Lipase    142


 


Urine Color   Yellow 


 


Urine Clarity   Clear 


 


Urine pH   5.0 


 


Ur Specific Gravity   1.019 


 


Urine Protein   Negative 


 


Urine Glucose (UA)   Normal 


 


Urine Ketones   Negative 


 


Urine Blood   Negative 


 


Urine Nitrate   Negative 


 


Urine Bilirubin   Negative 


 


Urine Urobilinogen   Normal 


 


Ur Leukocyte Esterase   Neg 


 


Urine WBC (Auto)   < 1 


 


Urine RBC (Auto)   < 1 


 


Urine Opiates Screen   


 


Urine Methadone Screen   


 


Ur Barbiturates Screen   


 


Ur Phencyclidine Scrn   


 


Ur Amphetamines Screen   


 


U Benzodiazepines Scrn   


 


U Oth Cocaine Metabols   


 


U Cannabinoids Screen   


 


Alcohol, Quantitative   














  10/04/18 10/04/18





  16:50 19:18


 


WBC  


 


RBC  


 


Hgb  


 


Hct  


 


MCV  


 


MCH  


 


MCHC  


 


RDW  


 


Plt Count  


 


MPV  


 


Neut % (Auto)  


 


Lymph % (Auto)  


 


Mono % (Auto)  


 


Eos % (Auto)  


 


Baso % (Auto)  


 


Neut # (Auto)  


 


Lymph # (Auto)  


 


Mono # (Auto)  


 


Eos # (Auto)  


 


Baso # (Auto)  


 


Sodium  


 


Potassium  


 


Chloride  


 


Carbon Dioxide  


 


Anion Gap  


 


BUN  


 


Creatinine  


 


Est GFR ( Amer)  


 


Est GFR (Non-Af Amer)  


 


Random Glucose  


 


Calcium  


 


Total Bilirubin  


 


AST  


 


ALT  


 


Alkaline Phosphatase  


 


Total Protein  


 


Albumin  


 


Globulin  


 


Albumin/Globulin Ratio  


 


Lipase  


 


Urine Color  


 


Urine Clarity  


 


Urine pH  


 


Ur Specific Gravity  


 


Urine Protein  


 


Urine Glucose (UA)  


 


Urine Ketones  


 


Urine Blood  


 


Urine Nitrate  


 


Urine Bilirubin  


 


Urine Urobilinogen  


 


Ur Leukocyte Esterase  


 


Urine WBC (Auto)  


 


Urine RBC (Auto)  


 


Urine Opiates Screen   Negative


 


Urine Methadone Screen   Negative


 


Ur Barbiturates Screen   Negative


 


Ur Phencyclidine Scrn   Negative


 


Ur Amphetamines Screen   Negative


 


U Benzodiazepines Scrn   Negative


 


U Oth Cocaine Metabols   Negative


 


U Cannabinoids Screen   Negative


 


Alcohol, Quantitative  < 10

## 2018-10-05 VITALS
OXYGEN SATURATION: 97 % | SYSTOLIC BLOOD PRESSURE: 120 MMHG | DIASTOLIC BLOOD PRESSURE: 81 MMHG | HEART RATE: 81 BPM | TEMPERATURE: 97.8 F

## 2018-10-05 RX ADMIN — WATER SCH MLS/HR: 1 INJECTION INTRAMUSCULAR; INTRAVENOUS; SUBCUTANEOUS at 05:33

## 2018-10-05 RX ADMIN — DEXTROSE AND SODIUM CHLORIDE SCH: 5; 450 INJECTION, SOLUTION INTRAVENOUS at 00:29

## 2018-10-05 NOTE — CP.PCM.DIS
Provider





- Provider


Date of Admission: 


10/04/18 01:35





Attending physician: 


Aba Baird MD








Hospital Course





- Lab Results


Lab Results: 


                             Most Recent Lab Values











WBC  11.5 K/uL (4.8-10.8)  H  10/03/18  22:33    


 


RBC  4.39 Mil/uL (4.40-5.90)  L  10/03/18  22:33    


 


Hgb  14.7 g/dL (12.0-18.0)   10/03/18  22:33    


 


Hct  43.5 % (35.0-51.0)   10/03/18  22:33    


 


MCV  99.3 fL (80.0-94.0)  H  10/03/18  22:33    


 


MCH  33.4 pg (27.0-31.0)  H  10/03/18  22:33    


 


MCHC  33.7 g/dL (33.0-37.0)   10/03/18  22:33    


 


RDW  13.9 % (11.5-14.5)   10/03/18  22:33    


 


Plt Count  264 K/uL (130-400)   10/03/18  22:33    


 


MPV  8.6 fL (7.2-11.7)   10/03/18  22:33    


 


Neut % (Auto)  53.1 % (50.0-75.0)   10/03/18  22:33    


 


Lymph % (Auto)  34.3 % (20.0-40.0)   10/03/18  22:33    


 


Mono % (Auto)  8.3 % (0.0-10.0)   10/03/18  22:33    


 


Eos % (Auto)  3.0 % (0.0-4.0)   10/03/18  22:33    


 


Baso % (Auto)  1.3 % (0.0-2.0)   10/03/18  22:33    


 


Neut # (Auto)  6.1 K/uL (1.8-7.0)   10/03/18  22:33    


 


Lymph # (Auto)  3.9 K/uL (1.0-4.3)   10/03/18  22:33    


 


Mono # (Auto)  1.0 K/uL (0.0-0.8)  H  10/03/18  22:33    


 


Eos # (Auto)  0.3 K/uL (0.0-0.7)   10/03/18  22:33    


 


Baso # (Auto)  0.1 K/uL (0.0-0.2)   10/03/18  22:33    


 


Sodium  145 mmol/L (132-148)   10/03/18  22:33    


 


Potassium  3.9 mmol/L (3.6-5.2)   10/03/18  22:33    


 


Chloride  107 mmol/L ()   10/03/18  22:33    


 


Carbon Dioxide  26 mmol/L (22-30)   10/03/18  22:33    


 


Anion Gap  17  (10-20)   10/03/18  22:33    


 


BUN  12 mg/dL (9-20)   10/03/18  22:33    


 


Creatinine  0.7 mg/dL (0.8-1.5)  L  10/03/18  22:33    


 


Est GFR ( Amer)  > 60   10/03/18  22:33    


 


Est GFR (Non-Af Amer)  > 60   10/03/18  22:33    


 


Random Glucose  94 mg/dL ()   10/03/18  22:33    


 


Calcium  9.1 mg/dl (8.6-10.4)   10/03/18  22:33    


 


Total Bilirubin  0.5 mg/dL (0.2-1.3)   10/03/18  22:33    


 


AST  33 U/L (17-59)   10/03/18  22:33    


 


ALT  24 U/L (21-72)   10/03/18  22:33    


 


Alkaline Phosphatase  77 U/L ()   10/03/18  22:33    


 


Total Protein  7.8 g/dL (6.3-8.3)   10/03/18  22:33    


 


Albumin  4.5 g/dL (3.5-5.0)   10/03/18  22:33    


 


Globulin  3.3 gm/dL (2.2-3.9)   10/03/18  22:33    


 


Albumin/Globulin Ratio  1.4  (1.0-2.1)   10/03/18  22:33    


 


Lipase  142 U/L ()   10/03/18  22:33    


 


Urine Color  Yellow  (YELLOW)   10/03/18  22:33    


 


Urine Clarity  Clear  (Clear)   10/03/18  22:33    


 


Urine pH  5.0  (5.0-8.0)   10/03/18  22:33    


 


Ur Specific Gravity  1.019  (1.003-1.030)   10/03/18  22:33    


 


Urine Protein  Negative mg/dL (NEGATIVE)   10/03/18  22:33    


 


Urine Glucose (UA)  Normal mg/dL (Normal)   10/03/18  22:33    


 


Urine Ketones  Negative mg/dL (NEGATIVE)   10/03/18  22:33    


 


Urine Blood  Negative  (NEGATIVE)   10/03/18  22:33    


 


Urine Nitrate  Negative  (NEGATIVE)   10/03/18  22:33    


 


Urine Bilirubin  Negative  (NEGATIVE)   10/03/18  22:33    


 


Urine Urobilinogen  Normal mg/dL (0.2-1.0)   10/03/18  22:33    


 


Ur Leukocyte Esterase  Neg Geovanna/uL (Negative)   10/03/18  22:33    


 


Urine WBC (Auto)  < 1 /hpf (0-5)   10/03/18  22:33    


 


Urine RBC (Auto)  < 1 /hpf (0-3)   10/03/18  22:33    


 


Urine Opiates Screen  Negative  (NEGATIVE)   10/04/18  19:18    


 


Urine Methadone Screen  Negative  (NEGATIVE)   10/04/18  19:18    


 


Ur Barbiturates Screen  Negative  (NEGATIVE)   10/04/18  19:18    


 


Ur Phencyclidine Scrn  Negative  (NEGATIVE)   10/04/18  19:18    


 


Ur Amphetamines Screen  Negative  (NEGATIVE)   10/04/18  19:18    


 


U Benzodiazepines Scrn  Negative  (NEGATIVE)   10/04/18  19:18    


 


U Oth Cocaine Metabols  Negative  (NEGATIVE)   10/04/18  19:18    


 


U Cannabinoids Screen  Negative  (NEGATIVE)   10/04/18  19:18    


 


Alcohol, Quantitative  < 10 mg/dl (0-10)   10/04/18  16:50    














Discharge Plan





- Follow Up Plan


Condition: STABLE


Disposition: AGAINST MEDICAL ADVICE

## 2018-10-05 NOTE — HP
CHIEF COMPLAINT:  Right upper quadrant abdominal pain.



HISTORY OF PRESENT ILLNESS:  This is a 53-year-old white male.  He smokes,

and he drinks.  He denies any substance abuse.  He has history of hepatitis

C which was treated for two months with the medication by his hepatologist,

and the patient was told that he has been cured from hepatitis C.  The

patient is in his usual state of health.  He is ambulatory, and independent

in activities of daily living.  He drinks.  At present, came in because for

last three days, he has been having right upper quadrant pain around right

flank area going to the back, associated with nausea.  No vomiting.  No

diarrhea.  The patient denies any fever or chills.  The patient denies

dysuria, hematuria, or pyuria.  He denies any history of frequency of

urination.  He denies any history of polyuria, polydipsia, or polyphagia. 

He denies any history of cough, sore throat, or runny nose.  He denies any

sneezing.  He denies any history of bruising.  He denies any history of

knee pain or hip pain.  He denies any history of rash.



SOCIAL HISTORY:  He smokes.  He drinks.  He denies drugs.



FAMILY HISTORY:  Noncontributory.



CURRENT MEDICATIONS:  At home, the patient is not on any medications.



ALLERGIES:  UNKNOWN ALLERGIES.



PHYSICAL EXAMINATION:

GENERAL:  A middle-aged male in no acute distress.

VITAL SIGNS:  Blood pressure 144/84, pulse 89, respiratory rate 20, and

temperature 97.2.

SKIN:  Flushed.  No bruises.  No purpura.  No petechiae.

HEENT:  Atraumatic and normocephalic.  Negative pallor.  Negative jaundice.

Extraocular movements are intact.

NECK:  Supple.  No JVD.  No lymph node.  No thyromegaly.  No carotid bruit.

CHEST WALL:  Bilaterally symmetrical expansion.  No masses.

LUNGS:  Bilaterally clear.  No rales.  No rhonchi.

CVS:  PMI not localized.  S1 and S2 regular.  No heave.  No thrill.

ABDOMEN:  Soft and nontender.  Bowel sounds are positive.  No masses.  No

guarding.  No tenderness.  No rigidity.  No visible peristalsis.

RECTAL:  Negative.

EXTREMITIES:  No clubbing, cyanosis, or edema.

CNS:  Awake, alert, and oriented x3.



ASSESSMENT:

1.  Right upper quadrant pain, could be costochondritis, could be right

lower lobe pneumonia, could be due to hepatitis C, could be gastritis

versus peptic ulcer disease.

2.  Dehydration.



PLAN:  Admit.  Detailed orders written.  Seen and examined.





__________________________________________

Aba Baird MD





DD:  10/04/2018 22:31:28

DT:  10/05/2018 2:52:50

Job # [id number]

## 2018-10-08 NOTE — DS
ADMISSION DIAGNOSIS:  Abdominal pain.



DISCHARGE DIAGNOSIS:  Abdominal pain, etiology unknown.



This is a 53-year-old male with a history of hepatitis C which was treated,

and he was stabilized and has been doing well.  He developed right upper

quadrant abdominal pain radiating posteriorly.  The patient underwent a CT

of abdomen and pelvis with IV and oral contrast, and the CAT scan was

essentially negative for any acute pathology.  The patient was under

treatment.  GI evaluation was called in.  While workup was in progress, the

patient absconded and he left the floor.





__________________________________________

Aba Baird MD



DD:  10/05/2018 23:44:08

DT:  10/06/2018 4:16:35

Job # [id number]

## 2018-11-15 ENCOUNTER — HOSPITAL ENCOUNTER (INPATIENT)
Dept: HOSPITAL 31 - C.ER | Age: 53
LOS: 5 days | Discharge: HOME | DRG: 745 | End: 2018-11-20
Attending: PSYCHIATRY & NEUROLOGY | Admitting: PSYCHIATRY & NEUROLOGY
Payer: COMMERCIAL

## 2018-11-15 VITALS — BODY MASS INDEX: 23.6 KG/M2

## 2018-11-15 DIAGNOSIS — F10.230: Primary | ICD-10-CM

## 2018-11-15 DIAGNOSIS — F17.210: ICD-10-CM

## 2018-11-15 DIAGNOSIS — F41.1: ICD-10-CM

## 2018-11-15 DIAGNOSIS — F11.10: ICD-10-CM
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
## 2018-11-15 LAB
ALBUMIN SERPL-MCNC: 4.5 G/DL (ref 3.5–5)
ALBUMIN/GLOB SERPL: 1.4 {RATIO} (ref 1–2.1)
ALT SERPL-CCNC: 26 U/L (ref 21–72)
AST SERPL-CCNC: 31 U/L (ref 17–59)
BASOPHILS # BLD AUTO: 0.1 K/UL (ref 0–0.2)
BASOPHILS NFR BLD: 0.8 % (ref 0–2)
BILIRUB UR-MCNC: NEGATIVE MG/DL
BUN SERPL-MCNC: 10 MG/DL (ref 9–20)
CALCIUM SERPL-MCNC: 9.7 MG/DL (ref 8.6–10.4)
EOSINOPHIL # BLD AUTO: 0.1 K/UL (ref 0–0.7)
EOSINOPHIL NFR BLD: 0.5 % (ref 0–4)
ERYTHROCYTE [DISTWIDTH] IN BLOOD BY AUTOMATED COUNT: 13.7 % (ref 11.5–14.5)
GFR NON-AFRICAN AMERICAN: > 60
GLUCOSE UR STRIP-MCNC: NORMAL MG/DL
HGB BLD-MCNC: 16.3 G/DL (ref 12–18)
LEUKOCYTE ESTERASE UR-ACNC: (no result) LEU/UL
LYMPHOCYTES # BLD AUTO: 2.3 K/UL (ref 1–4.3)
LYMPHOCYTES NFR BLD AUTO: 16.7 % (ref 20–40)
MCH RBC QN AUTO: 33.6 PG (ref 27–31)
MCHC RBC AUTO-ENTMCNC: 33.6 G/DL (ref 33–37)
MCV RBC AUTO: 99.8 FL (ref 80–94)
MONOCYTES # BLD: 0.8 K/UL (ref 0–0.8)
MONOCYTES NFR BLD: 5.7 % (ref 0–10)
NEUTROPHILS # BLD: 10.3 K/UL (ref 1.8–7)
NEUTROPHILS NFR BLD AUTO: 76.3 % (ref 50–75)
NRBC BLD AUTO-RTO: 0 % (ref 0–2)
PH UR STRIP: 6 [PH] (ref 5–8)
PLATELET # BLD: 310 K/UL (ref 130–400)
PMV BLD AUTO: 9.4 FL (ref 7.2–11.7)
PROT UR STRIP-MCNC: NEGATIVE MG/DL
RBC # BLD AUTO: 4.85 MIL/UL (ref 4.4–5.9)
RBC # UR STRIP: NEGATIVE /UL
SP GR UR STRIP: 1.01 (ref 1–1.03)
SQUAMOUS EPITHIAL: < 1 /HPF (ref 0–5)
UROBILINOGEN UR-MCNC: NORMAL MG/DL (ref 0.2–1)
WBC # BLD AUTO: 13.5 K/UL (ref 4.8–10.8)

## 2018-11-15 PROCEDURE — HZ81ZZZ MEDICATION MANAGEMENT FOR SUBSTANCE ABUSE TREATMENT, METHADONE MAINTENANCE: ICD-10-PCS | Performed by: PSYCHIATRY & NEUROLOGY

## 2018-11-15 PROCEDURE — GZ3ZZZZ MEDICATION MANAGEMENT: ICD-10-PCS | Performed by: PSYCHIATRY & NEUROLOGY

## 2018-11-15 PROCEDURE — HZ59ZZZ INDIVIDUAL PSYCHOTHERAPY FOR SUBSTANCE ABUSE TREATMENT, SUPPORTIVE: ICD-10-PCS | Performed by: PSYCHIATRY & NEUROLOGY

## 2018-11-15 PROCEDURE — HZ46ZZZ GROUP COUNSELING FOR SUBSTANCE ABUSE TREATMENT, PSYCHOEDUCATION: ICD-10-PCS | Performed by: PSYCHIATRY & NEUROLOGY

## 2018-11-15 PROCEDURE — HZ2ZZZZ DETOXIFICATION SERVICES FOR SUBSTANCE ABUSE TREATMENT: ICD-10-PCS | Performed by: PSYCHIATRY & NEUROLOGY

## 2018-11-15 PROCEDURE — HZ80ZZZ MEDICATION MANAGEMENT FOR SUBSTANCE ABUSE TREATMENT, NICOTINE REPLACEMENT: ICD-10-PCS | Performed by: PSYCHIATRY & NEUROLOGY

## 2018-11-15 NOTE — C.PDOC
History Of Present Illness


54 y/o male presents to ED requesting detox from ETOH and heroin and with 

complaints of withdrawing from substances. Patient states last drink was this 

morning and denies SI/HI, auditory or visual hallucinations. No other complaints

at this time.  


Time Seen by Provider: 11/15/18 13:03


Chief Complaint (Nursing): Substance Abuse


History Per: Patient


History/Exam Limitations: no limitations


Onset/Duration Of Symptoms: Days


Current Symptoms Are (Timing): Still Present


Suicide/Self Injury Attempted (Context): None


Modifying Factor(s): Alcohol





Past Medical History


Reviewed: Historical Data, Nursing Documentation, Vital Signs


Vital Signs: 





                                Last Vital Signs











Temp  97.8 F   11/15/18 12:34


 


Pulse  100 H  11/15/18 12:34


 


Resp  20   11/15/18 12:34


 


BP  110/70   11/15/18 12:34


 


Pulse Ox  97   11/15/18 12:34














- Medical History


PMH: Anxiety, Back Problems


Surgical History: No Surg Hx





- [facility] Procedures











DETOXIFICATION SERVICES FOR SUBSTANCE ABUSE TREATMENT (12/13/17)


[facility] PSYCHOTHERAPY FOR SUBSTANCE ABUSE TREATMENT, SUPPORT (12/13/17)


[facility] PSYCHOTHERAPY FOR SUBSTANCE ABUSE, COGNITIV BEHAVIORAL (12/13/17)


[facility] PSYCHOTHERAPY FOR SUBSTANCE ABUSE, PSYCHOEDUCATION (12/13/17)








Family History: States: No Known Family Hx





- Social History


Hx Tobacco Use: Yes


Hx Alcohol Use: Yes


Hx Substance Use: Yes





- Immunization History


Hx Tetanus Toxoid Vaccination: Yes


Hx Influenza Vaccination: No


Hx Pneumococcal Vaccination: No





Review Of Systems


Constitutional: Negative for: Fever, Chills


Cardiovascular: Negative for: Chest Pain


Respiratory: Negative for: Cough, Shortness of Breath


Gastrointestinal: Negative for: Nausea, Vomiting


Musculoskeletal: Negative for: Arm Pain


Psych: Positive for: Withdrawal, Other (substance abuse).  Negative for: 

Suicidal ideation





Physical Exam





- Physical Exam


Appears: Non-toxic, No Acute Distress, Other (sleepy but arousable to voice)


Skin: Warm, Dry, No Rash


Head: Atraumatic, Normacephalic


Eye(s): bilateral: Normal Inspection


Oral Mucosa: Moist


Neck: Normal ROM, Supple


Cardiovascular: Rhythm Regular


Respiratory: Normal Breath Sounds, No Rales, No Rhonchi, No Wheezing


Gastrointestinal/Abdominal: Soft, No Tenderness, No Guarding, No Rebound


Extremity: Normal ROM, Capillary Refill (<2 seconds)


Neurological/Psych: Oriented x3, Normal Speech, Normal Cognition, Other (no 

acute intox)





ED Course And Treatment





- Laboratory Results


Result Diagrams: 


                                 11/15/18 14:29





                                 11/15/18 14:29


O2 Sat by Pulse Oximetry: 97 (RA)


Pulse Ox Interpretation: Normal


Reevaluation Time: 17:08


Reassessment Condition: Improved (MED CLEAR FOR DETOX. CRISIS NOTIFIED)





- Physician Consult Information


Time Consulting Physician Contacted: 17:09


Physician Contacted: Spring Lira





Disposition


Counseled Patient/Family Regarding: Studies Performed, Diagnosis





- Disposition


Disposition: HOSPITALIZED


Disposition Time: 17:09


Condition: SERIOUS


Forms:  [facility] (English)





- POA


Present On Arrival: None





- Clinical Impression


Clinical Impression: 


 Alcohol abuse, Opiate abuse, continuous








- Scribe Statement


The provider has reviewed the documentation as recorded by the Scribkary Mcmullen





All medical record entries made by the Scribe were at my direction and 

personally dictated by me. I have reviewed the chart and agree that the record 

accurately reflects my personal performance of the history, physical exam, 

medical decision making, and the department course for this patient. I have also

personally directed, reviewed, and agree with the discharge instructions and 

disposition.

## 2018-11-15 NOTE — PCM.BM
<JonelJanelle - Last Filed: 11/15/18 17:36>





Treatment Plan Problems





- Problems identified on initial assessmt


  ** Potential for alcohol withdrawal


Date Initiated: 11/15/18


Time Initiated: 17:37


Assessment reference: NA


Status: Active





  ** Potential for opiate withdrawal


Date Initiated: 11/15/18


Time Initiated: 17:37


Assessment reference: NA


Status: Active





Treatment assets and liabiliti


Patient Assests: self-reliant, ADL independent, negotiates basic needs, cogn

itively intact


Patient Liabilities: substance abuse (alcohol,opiates)





- Milieu Protocol


Maintain good personal hygiene: daily Encourage regular showers, daily Remind 

patient to perform daily oral care, daily Assist patient to perform ADL's


Conduct patient checks and document Observation sheet: Q15 minutes


Maintain personal safety: every shift Educate patient to report safety concerns 

to staff, every shift Monitor environment for contraband/sharps


Medication safety: Monitor for expected outcome, potential side effects: every 

shift, Assess barriers to learning: every shift, Assess readiness for medication

education: every shift





<Beth Anderson - Last Filed: 11/16/18 11:12>





Family Contact


Family involvement: Famliy/SO not involved





- Goals for Treatment


Patient goals for treatment: Complete detox and discuss aftercare options with 

counseling staff.





Discharge/Continuing Care





- Education Needs


Education Needs: Patient Medication, Patient Diagnosis/Disease Process, Patient 

Coping Skills, Patient Anger Management skills, Patient Placement options, 

Patient Community resources





- Discharge


Discharge Criteria: No longer exhibiting s/s of withdrawal, Reduction of target 

symptoms


Discharge to:: Other





- Additional Comments





11/16/18 11:12


Aftercare TBD as pt. is unsure as of this writing.





- Treatment Team Participation


Discussed with Family/SO: No


Was Patient/Family/SO present at Treatment Team Meeting: Yes

## 2018-11-16 RX ADMIN — Medication SCH TAB: at 10:37

## 2018-11-16 NOTE — PCM.PSYCH
Initial Psychiatric Evaluation





- Initial Psychiatric Evaluation


Type of Admission: Voluntary


Legal Status: Capacity


Chief Complaint (in patient's own words): 





"Alcohol"


History of Present Illness and Precipitating Events: 





The pt is seen, chart reviewed, case discussed





He is a 54 y/o WM, single, no child, homeless, currently unemployed


He drinks "a case" of beer for many years


He also used 3 bags heroin recently


He was in detox 10 times and rehab 2-3 times. 


Used methadone in [place] for a long time but quit 5 years ago, dose was 90 mg 


He used klonopin too





Past psych hx: ANxiety





Family psych hx: Same, anxiety





Medical hx: none


Current Medications: 





Active Medications











Generic Name Dose Route Start Last Admin





  Trade Name Freq  PRN Reason Stop Dose Admin


 


Chlordiazepoxide  50 mg  11/15/18 18:00  11/16/18 06:05





  Librium  PO   Not Given





  Q6 MARIA ELENA   





     





     





     





     


 


Hydroxyzine HCl  25 mg  11/15/18 17:57  11/15/18 21:25





  Atarax  PO   25 mg





  Q6 PRN   Administration





  Anxiety   





     





     





     


 


Trazodone HCl  50 mg  11/15/18 17:56  11/15/18 21:25





  Desyrel  PO   50 mg





  HS PRN   Administration





  Insomnia   





     





     





     














Past Psychiatric History





- Past Psychiatric History


Previous Treatment History: Intensive Outpatient


Pertinent Medical Hx (Current Medical&Sleep Prob, Allergies): 





                                    Allergies











Allergy/AdvReac Type Severity Reaction Status Date / Time


 


No Known Allergies Allergy   Verified 11/15/18 13:07








                                        





Paxil  11/15/18 











Review of Systems





- Neurological


Neurological: UNREMARKABLE (                                                    

                                                                                

                                                                                

                                                                           )





- Psychiatric


Psychiatric: Abnormal Sleep Pattern, Anhedonia, Anxiety, Behavioral Changes, 

Difficulty Concentrating.  absent: Hallucinations, Homicidal Ideation, Paranoia,

Suicidal Ideation





Mental Status Examination





- Personal Presentation


Personal Presentation: Looks stated age





- Affect


Affect: Constricted





- Motor Activity


Motor Activity: Calm





- Reliability in Providing Information


Reliability in Providing Information: Good





- Speech


Speech: Organized





- Mood


Mood: Depressed, Anxious





- Formal Thought Process


Formal Thought Process: No Impairment





- Cognitive Functions


Orientation: Person, Place, Situation, Time


Sensorium: Alert


Attention/Concentration: Easily distracted


Estimate of Intelligence: Average


Judgement: Intact, as evidence by: Insight regarding need for hospitalization


Memory: Recent intact, as evidence by: Ability to recall events of the day, 

Remote intact, as evidenced by: Abilit to recall sig. life events





- Risk


Risk: Withdrawal, Diminished functioning





- Strength & Assets Inventory


Strength & Assets Inventory: Employment history, Cooperative





- Limitations


Limitations: Living alone, Other





DSM 5 DX





- DSM 5


DSM 5 Diagnosis: 





Alcohol withdrawal


Alcohol use d/o - severe


opioid use d/o -severe


DORY





- Recommended/Plan of Treatment


Treatment Recommendations and Plan of Treatment: 





Taper with librium and methadone


As needed medications


Gabapentin for augmentation if needed


All risks, benefits and alternatives of medications, including no medications, 

discussed and the patient understood and agreed.


Attend groups and activities


Supportive therapy and psychoeducation


MI for abstinence


CBT for relapse prevention


Encourage MAT


Refer to rehab or IOP


Attend self-help groups as well


MI for smoking cessation and patch if needed


   


34 min


Projected ELOS: 4 days





- Smoking Cessation


Smoking Cessation Initiated: Yes

## 2018-11-17 RX ADMIN — MAGNESIUM HYDROXIDE PRN ML: 400 SUSPENSION ORAL at 14:49

## 2018-11-17 RX ADMIN — Medication SCH TAB: at 10:34

## 2018-11-17 NOTE — PCM.PYCHPN
Psychiatric Progress Note





- Psychiatric Progress Note


Patient seen today, length of contact: 15 min


Medication Change: Yes


Medical Record Reviewed: Yes





Mental Status Examination





- Homicidal Ideation


Homicidal Ideation: No

## 2018-11-18 RX ADMIN — Medication SCH TAB: at 09:51

## 2018-11-18 NOTE — PCM.PYCHPN
Psychiatric Progress Note





- Psychiatric Progress Note


Patient seen today, length of contact: 15 min


Patient Chief Complaint: 





I am feeling little better but still have some anxiety.


Problems Identified/Issues Discussed: 





Patient seen, chart reviewed, case discussed with the staff.





Issues related to illness and treatment were discussed with the patient and 

staff.





Reported compliant with treatment with no adverse effects.





Tolerating treatment very well.





Awake, alert and oriented x3.





Calm and cooperative with good eye contact.





Mood reported as okay.





Affect appropriate.





Feeling little better.Also reported feeling anxious at times.  Patient was 

started on Paxil.  Patient refused to take Paxil even after education.  Offered 

gabapentin.  Risks and benefits of gabapentin were discussed with the patient.  

Patient understood and agreed.  We will start gabapentin 300 mg twice a day.





Needs more time for stabilization.





Aftercare discussed with the patient.





Denied any delusions, auditory or visual hallucinations, suicidal ideations or 

homicidal ideations at the time of evaluation.


Medical Problems: 





None reported


Diagnostic Results: 





Patient seen, chart reviewed, case discussed with the staff.





Issues related to illness and treatment were discussed with the patient and 

staff.





Reported compliant with treatment with no adverse effects.





Tolerating treatment very well.





Awake, alert and oriented x3.





Calm and cooperative with good eye contact.





Mood reported as okay.





Affect appropriate.





Feeling little better.





Needs more time for stabilization.





Aftercare discussed with the patient.





Denied any delusions, auditory or visual hallucinations, suicidal ideations or 

homicidal ideations at the time of evaluation.


DSM 5 Symptoms Update: 





Some improvement with treatment


Medication Change: No


Medical Record Reviewed: Yes





Mental Status Examination





- Cognitive Function


Orientation: Person, Place, Situation, Time


Memory: Intact


Attention: WNL


Concentration: WNL


Association: [facility]


Fund of Knowledge: [facility]


Decription of patient's judgement and insights: 





Fair





- Mood


Mood: Anxious





- Affect


Affect: Other (Appropriate)





- Speech


Speech: Appropriate





- Formal Thought Process


Formal Thought Process: No Impairment


Psychotic Thoughts and Behaviors: 





None





- Suicidal Ideation


Suicidal Ideation: No





- Homicidal Ideation


Homicidal Ideation: No





Goal/Treatment Plan





- Goal/Treatment Plan


Need for Continued Stay: Remain at risks for inpatient hospitalization, 

Discharge may exacerbated symptoms, Severe functional impairment


Progress Toward Problem(s) and Goals/Treatment Plan: 





Patient education.


Supportive therapy.


CBT for relapse prevention.


MI for abstinence.


We will start gabapentin 300 mg twice a day.


Continue treatment as before.


Estimated Date of D/C: 11/20/18





- Smoking Cessation


Smoking Cessation Initiated: Yes

## 2018-11-18 NOTE — RAD
Chest x-ray two views 



HISTORY:

Rehab placement. 



Comparison: None available. 



Findings: No focal infiltrate or effusion. 



Heart size within normal limits. 



Impression: 



No focal infiltrate or effusion.

## 2018-11-19 RX ADMIN — MAGNESIUM HYDROXIDE PRN ML: 400 SUSPENSION ORAL at 16:04

## 2018-11-19 RX ADMIN — Medication SCH: at 10:26

## 2018-11-20 VITALS
DIASTOLIC BLOOD PRESSURE: 84 MMHG | RESPIRATION RATE: 18 BRPM | TEMPERATURE: 98 F | OXYGEN SATURATION: 98 % | SYSTOLIC BLOOD PRESSURE: 144 MMHG | HEART RATE: 78 BPM

## 2018-11-20 RX ADMIN — Medication SCH TAB: at 09:38

## 2018-11-20 NOTE — PCM.PYCHDC
Mental Status Examination





- Mental Status Examination


Orientation: Person, Place, Situation, Time


Memory: Intact


Mood: Anxious


Affect: Constricted


Speech: Appropriate


Attention: WNL


Concentration: WNL


Association: WNL


Fund of Knowledge: WNL


Formal Thought Process: No Impairment


Suicidal Ideation: No


Current Homicidal Ideation?: No





Discharge Summary





- Discharge Note


Consultations:: List each consultation separately and include:  1. Reason for 

request.  2. Findings.  3. Follow-up


Summary of Hospital Course include:: 1. Description of specific treatment plan 

utilized for patients during their course of treatmen.  2. Summarize the time-

course for resolution of acute symptoms and/or regressed behaviors.  3. Describe

issues identified and worked on during hospitalization.  4. Describe medication 

utilized.  5. Describe medical problems identified and treated.  6. Reassessment

of suicide risk


Summary of Hospital Course: 





The pt is seen, chart reviewed, case discussed





He is a 54 y/o WM, single, no child, homeless, currently unemployed


He drinks "a case" of beer for many years


He also used 3 bags heroin recently


He was in detox 10 times and rehab 2-3 times. 


Used methadone in [place] for a long time but quit 5 years ago, dose was 90 mg 


He used klonopin too





Past psych hx: ANxiety





Family psych hx: Same, anxiety





Medical hx: none


Hospital course:


The pt was admitted and started on treatment with psychotherapy, support, 

psychoeducation and medications. 


MI and CBT used.


The pt attended groups and activities, as well as milieu therapy.


All the risks and benefits of medications are discussed and the patient 

understood and agreed.


The pt improved with the treatments provided. 


After care discussed with the patient.








He went to [facility] 





- Final Diagnosis (DSM 5)


Condition upon Discharge: GOOD


DSM 5: 


Alcohol withdrawal


Alcohol use d/o - severe


opioid use d/o -severe


DORY





Disposition: HOME/ ROUTINE


Follow-up Treatment Plan: 


Continue below medications after discharge.


Follow after care plan as discussed.


Use relapse prevention skills   


Return to ER or call 911 if suicidal, homicidal or symptoms relapse.


Stay away from stress, alcohol and drugs.


See primary doctor regularly and get labs.    





Prescriptions/Medication Reconciliation: 


Gabapentin [Neurontin] 300 mg PO BID #60 cap


hydrOXYzine HCl [Atarax] 25 mg PO BID PRN #60 tab


 PRN Reason: Anxiety


traZODone [Desyrel] 50 mg PO HS PRN #30 tab


 PRN Reason: Insomnia

## 2019-02-06 ENCOUNTER — HOSPITAL ENCOUNTER (EMERGENCY)
Dept: HOSPITAL 31 - C.ER | Age: 54
Discharge: HOME | End: 2019-02-06
Payer: COMMERCIAL

## 2019-02-06 VITALS
SYSTOLIC BLOOD PRESSURE: 129 MMHG | TEMPERATURE: 97.5 F | RESPIRATION RATE: 18 BRPM | HEART RATE: 93 BPM | OXYGEN SATURATION: 97 % | DIASTOLIC BLOOD PRESSURE: 82 MMHG

## 2019-02-06 VITALS — BODY MASS INDEX: 23.6 KG/M2

## 2019-02-06 DIAGNOSIS — L02.01: Primary | ICD-10-CM

## 2019-02-06 DIAGNOSIS — Z72.0: ICD-10-CM

## 2019-02-06 NOTE — C.PDOC
History Of Present Illness


54 y/o male presents to the ER for evaluation of a infected sebaceous cysts on 

the left side of his chin/face. Patient denies having fever, chills, and 

drainage from the cysts.


Time Seen by Provider: 02/06/19 08:04


Chief Complaint (Nursing): Abnormal Skin Integrity


History Per: Patient


History/Exam Limitations: no limitations


Onset/Duration Of Symptoms: Days


Current Symptoms Are (Timing): Still Present


Severity: Moderate





Past Medical History


Reviewed: Historical Data, Nursing Documentation, Vital Signs


Vital Signs: 





                                Last Vital Signs











Temp  97.5 F L  02/06/19 08:03


 


Pulse  93 H  02/06/19 08:03


 


Resp  18   02/06/19 08:03


 


BP  129/82   02/06/19 08:03


 


Pulse Ox  97   02/06/19 08:03














- Medical History


PMH: Anxiety, Back Problems


Other Surgeries: Hx of surgeries





- [facility] Procedures











DETOXIFICATION SERVICES FOR SUBSTANCE ABUSE TREATMENT (11/15/18)


GROUP  FOR SUBSTANCE ABUSE TREATMENT, PSYCHOEDUCATION (11/15/18)


[facility] PSYCHOTHERAPY FOR SUBSTANCE ABUSE TREATMENT, SUPPORT (11/15/18)


[facility] PSYCHOTHERAP[facility] FOR SUBSTANCE ABUSE, COGNITIV BEHAVIORAL (12/13/17)


[facility] PSYCHOTHERAP[facility] FOR SUBSTANCE ABUSE, PSYCHOEDUCATION (12/13/17)


MEDICATION MANAGEMENT (11/15/18)


[facility] MGMT FOR SUBSTANCE ABUSE TREATMENT, METHADONE MAINT (11/15/18)


[facility] MGMT FOR SUBSTANCE ABUSE TREATMENT, NICOTINE REPLACE (11/15/18)








Family History: States: No Known Family Hx





- Social History


Hx Tobacco Use: Yes


Hx Alcohol Use: Yes


Hx Substance Use: No





- Immunization History


Hx Tetanus Toxoid Vaccination: Yes


Hx Influenza Vaccination: No


Hx Pneumococcal Vaccination: No





Review Of Systems


Except As Marked, All Systems Reviewed And Found Negative.


Constitutional: Negative for: Fever, Chills


Skin: Positive for: Other (sebaceous cysts to face)





Physical Exam





- Physical Exam


Appears: Non-toxic, No Acute Distress


Skin: Normal Color, Warm, Dry, Other (2 sebaceous cysts to left chin, 1 infected

cyst)


Head: Atraumatic, Normacephalic


Eye(s): bilateral: Normal Inspection


Nose: Normal


Oral Mucosa: Moist


Neck: Supple


Chest: Symmetrical


Neurological/Psych: Oriented x3, Normal Speech





ED Course And Treatment


O2 Sat by Pulse Oximetry: 97 (RA)


Pulse Ox Interpretation: Normal


Progress Note: Treated with keflex 500 mg PO.  DSD applied.  Patient advised to 

follow up at clinic


Reassessment Condition: Improved





- Incision & Drainage Of Abscess


Anesthesia: Lidocaine 1%


Prep Used: Sterile Water, Betadine


Procedure: Incised W/Scalpel Blade#: (11), Drained Pus, Irrigated Cavity 

W/Saline, Probed To Break Up Loculations, Packed W/Gauze, Cultures Obtained And 

Sent To Lab





Medical Decision Making


Medical Decision Making: 


Plan:


-Incision & Drainage





Disposition





- Disposition


Referrals: 


[facility] [Outside]


[facility] [Outside]


Disposition: HOME/ ROUTINE


Disposition Time: 09:15


Condition: STABLE


Additional Instructions: 


warm compresses to affected area


Follow up with dermatology for further evaluation





Prescriptions: 


Cephalexin [cephalexin] 500 mg PO Q8 #15 cap


Instructions:  Boil, Abscess Incision and Drainage (DC)


Forms:  [facility] (English)





- Clinical Impression


Clinical Impression: 


 Abscess








- PA / NP / Resident Statement


MD/DO has reviewed & agrees with the documentation as recorded.





- Scribe Statement


The provider has reviewed the documentation as recorded by the Abrahamibe


Dangelo Mcnair





Provider Attestation





All medical record entries made by the Scribe were at my direction and personall

y dictated by me. I have reviewed the chart and agree that the record accurately

 reflects my personal performance of the history, physical exam, medical 

decision making, and the department course for this patient. I have also 

personally directed, reviewed, and agree with the discharge instructions and 

disposition.

## 2019-10-24 ENCOUNTER — HOSPITAL ENCOUNTER (INPATIENT)
Dept: HOSPITAL 74 - YASAS | Age: 54
LOS: 4 days | Discharge: HOME | DRG: 773 | End: 2019-10-28
Attending: ALLERGY & IMMUNOLOGY | Admitting: ALLERGY & IMMUNOLOGY
Payer: COMMERCIAL

## 2019-10-24 VITALS — BODY MASS INDEX: 22.4 KG/M2

## 2019-10-24 DIAGNOSIS — Z59.0: ICD-10-CM

## 2019-10-24 DIAGNOSIS — F10.220: ICD-10-CM

## 2019-10-24 DIAGNOSIS — M54.5: ICD-10-CM

## 2019-10-24 DIAGNOSIS — F10.230: ICD-10-CM

## 2019-10-24 DIAGNOSIS — F11.23: Primary | ICD-10-CM

## 2019-10-24 DIAGNOSIS — F17.210: ICD-10-CM

## 2019-10-24 PROCEDURE — HZ2ZZZZ DETOXIFICATION SERVICES FOR SUBSTANCE ABUSE TREATMENT: ICD-10-PCS | Performed by: ALLERGY & IMMUNOLOGY

## 2019-10-24 RX ADMIN — NICOTINE SCH MG: 14 PATCH, EXTENDED RELEASE TRANSDERMAL at 14:48

## 2019-10-24 RX ADMIN — Medication SCH MG: at 22:33

## 2019-10-24 RX ADMIN — HYDROXYZINE PAMOATE PRN MG: 25 CAPSULE ORAL at 16:59

## 2019-10-24 SDOH — ECONOMIC STABILITY - HOUSING INSECURITY: HOMELESSNESS: Z59.0

## 2019-10-24 NOTE — HP
COWS





- Scale


Resting Pulse: 1= [place] 


Sweatin= No chills or Flushing


Restless Observation: 5= Unable to Sit Still


Pupil Size: 0= Normal to Room Light


Bone or Joint Aches: 0= None


Runny Nose/ Eye Tearin= None


GI Upset > 30mins: 0= None


Tremor Observation: 0= None


Yawning Observation: 0= None


Anxiety or Irritability: 2=Irritable/Anxious


Goose Flesh Skin: 0=Smooth Skin


COWS Score: 8





CIWA Score


Nausea/Vomitin-No Nausea/No Vomiting


Muscle Tremors: None


Anxiety: 2


Agitation: 2


Paroxysmal Sweats: No Perspiration


Orientation: 0-Oriented


Tacttile Disturbances: 0-None


Auditory Disturbances: 0-None


Visual Disturbances: 0-None


Headache: 0-None Present


CIWA-Ar Total Score: 4





- Admission Criteria


[facility] Guidelines: Admission for Medically Managed Detox: 


Requires at least one of the followin. CIWA greater than 12


2. Seizures within the past 24 hours


3. Delirium tremens within the past 24 hours


4. Hallucinations within the past 24 hours


5. Acute intervention needed for co  occurring medical disorder


6. Acute intervention needed for co  occurring psychiatric disorder


7. Severe withdrawal that cannot be handled at a lower level of care (continued


    vomiting, continued diarrhea, abnormal vital signs) requiring intravenous


    medication and/or fluids


8. Pregnancy








Admission ROS [facility]


Allergies/Adverse Reactions: 


 Allergies











Allergy/AdvReac Type Severity Reaction Status Date / Time


 


No Known Allergies Allergy   Verified 10/24/19 10:59











History of Present Illness: 





 This report was requested by: Nickie Pendleton | Reference #: [id number]


Others' Prescriptions


Patient Name:    Vik Nina    YOB: 1965


Address:    [address] [place] [address]    Sex:    Male


Rx Written    Rx Dispensed    Drug    Quantity    Days Supply    Prescriber Name


10/03/2019    10/03/2019    buprenorphine-naloxone 8-2 mg sl film    9    3    

LaksJuan MD


10/03/2019    10/03/2019    chlordiazepoxide 10 mg capsule    35    3    LaksJuan MD


2019    buprenorphine-naloxone 4-1 mg sl film    3    1    

LaksJuan MD


2019    buprenorphine-naloxone 8-2 mg sl film    9    3    

LaksJuan MD


2019    chlordiazepoxide 10 mg capsule    35    3    Laks, 

Juan RUBIO





pt here requesting detox from alcohol and heroin , reports  5-10 bags via 

inhalation, denies ivdu , latest use this morning .first  age of use 20 ,  

intermittent sobriety 2 yrs/ 7 yrs/ 7 mo  ,  denies OD  ,was in  detox 2 weeks 

ago . 


etoh : 20 cans of beer/day , reports mild tremors if not drinking , reports 1-2 

mo use , prior social use , denies seizures or blackouts . latest use today , 

current ann 0.021 


pmhx : anxiety , hep c s/p tx w/ [facility] 2 yrs ago ,per pt he completed  tx. 


Exam Limitations: No Limitations





- Ebola screening


Have you traveled outside of the country in the last 21 days: No


Have you had contact with anyone from an Ebola affected area: No


Do you have a fever: No





- Review of Systems


Constitutional: Loss of Appetite


EENT: reports: Other (glasses)


Respiratory: reports: No Symptoms reported


Cardiac: reports: No Symptoms Reported


GI: reports: Constipated, Poor Appetite


: reports: No Symptoms Reported


Musculoskeletal: reports: No Symptoms Reported


Integumentary: reports: No Symptoms Reported


Neuro: reports: No Symptoms reported


Endocrine: reports: No Symptoms Reported


Psychiatric: reports: Orientated x3, Agitated, Anxious





Patient History





- Smoking Cessation


Smoking history: Current every day smoker


Have you smoked in the past 12 months: Yes


Hx Chewing Tobacco Use: No


Initiated information on smoking cessation: Yes


'Breaking Loose' booklet given: 10/24/19





- Substances abused


  ** Alcohol


Substance route: Oral


Frequency: Daily


Amount used: 24 cans of beer. 1 pint of vodka


Age of first use: 11


Date of last use: 10/24/19





  ** Heroin


Substance route: Inhalation


Frequency: Daily


Amount used: 5-10 bags


Age of first use: 20


Date of last use: 10/24/19





Admission Physical Exam [facility]





- Vital Signs


Vital Signs: 


 Vital Signs - 24 hr











  10/24/19





  10:59


 


Temperature 97.7 F


 


Pulse Rate 85


 


Respiratory 18





Rate 


 


Blood Pressure 101/73














- Physical


General Appearance: Yes: Mild Distress, Alcohol on Breath, Intoxicated, 

Irritable, Anxious, Other (slurred speech)


HEENTM: Yes: EOMI, Hearing grossly Normal, Normocephalic, Normal Voice


Respiratory: Yes: Chest Non-Tender, Lungs Clear, Normal Breath Sounds, No 

Respiratory Distress, No Accessory Muscle Use


Neck: Yes: No masses,lesions,Nodules, Trachea in good position


Cardiology: Yes: Regular Rhythm, Regular Rate, S1, S2


Abdominal: Yes: Non Tender, Soft


Genitourinary: Yes: Within Normal Limits


Musculoskeletal: Yes: full range of Motion, Gait Steady


Extremities: Yes: Normal Range of Motion, Non-Tender


Neurological: Yes: Fully Oriented, Alert, Motor Strength 5/5


Integumentary: Yes: Warm





- Diagnostic


(1) Opioid dependence


Current Visit: Yes   Status: Acute   


Qualifiers: 


   Substance use status: with intoxication 





(2) Nicotine dependence


Current Visit: Yes   Status: Chronic   


Qualifiers: 


   Nicotine product type: cigarettes 





(3) Alcohol use disorder, mild, abuse


Current Visit: Yes   Status: Acute   





Inpatient Rehab Admission





- Rehab Decision to Admit


Inpatient rehab admission?: No

## 2019-10-25 LAB
ALBUMIN SERPL-MCNC: 3.4 G/DL (ref 3.4–5)
ALP SERPL-CCNC: 72 U/L (ref 45–117)
ALT SERPL-CCNC: 23 U/L (ref 13–61)
ANION GAP SERPL CALC-SCNC: 6 MMOL/L (ref 8–16)
AST SERPL-CCNC: 17 U/L (ref 15–37)
BILIRUB SERPL-MCNC: 0.4 MG/DL (ref 0.2–1)
BUN SERPL-MCNC: 10.4 MG/DL (ref 7–18)
CALCIUM SERPL-MCNC: 8.8 MG/DL (ref 8.5–10.1)
CHLORIDE SERPL-SCNC: 109 MMOL/L (ref 98–107)
CO2 SERPL-SCNC: 28 MMOL/L (ref 21–32)
CREAT SERPL-MCNC: 0.7 MG/DL (ref 0.55–1.3)
DEPRECATED RDW RBC AUTO: 14.9 % (ref 11.9–15.9)
GLUCOSE SERPL-MCNC: 97 MG/DL (ref 74–106)
HCT VFR BLD CALC: 41.1 % (ref 35.4–49)
HGB BLD-MCNC: 13.6 GM/DL (ref 11.7–16.9)
MCH RBC QN AUTO: 32.2 PG (ref 25.7–33.7)
MCHC RBC AUTO-ENTMCNC: 33.1 G/DL (ref 32–35.9)
MCV RBC: 97.4 FL (ref 80–96)
PLATELET # BLD AUTO: 223 K/MM3 (ref 134–434)
PMV BLD: 9.4 FL (ref 7.5–11.1)
POTASSIUM SERPLBLD-SCNC: 4.5 MMOL/L (ref 3.5–5.1)
PROT SERPL-MCNC: 6.5 G/DL (ref 6.4–8.2)
RBC # BLD AUTO: 4.22 M/MM3 (ref 4–5.6)
SODIUM SERPL-SCNC: 143 MMOL/L (ref 136–145)
WBC # BLD AUTO: 8.8 K/MM3 (ref 4–10)

## 2019-10-25 RX ADMIN — NICOTINE SCH MG: 14 PATCH, EXTENDED RELEASE TRANSDERMAL at 10:25

## 2019-10-25 RX ADMIN — Medication SCH TAB: at 10:25

## 2019-10-25 RX ADMIN — Medication SCH MG: at 22:35

## 2019-10-25 NOTE — PN
[facility] CIWA





- CIWA Score


Nausea/Vomitin-Mild Nausea/No Vomiting


Muscle Tremors: 1-None Visible, but Felt


Anxiety: 2


Agitation: 2


Paroxysmal Sweats: No Perspiration


Orientation: 0-Oriented


Tacttile Disturbances: 1-Very Mild Itch/Numbness


Auditory Disturbances: 0-None


Visual Disturbances: 0-None


Headache: 1-Very Mild


CIWA-Ar Total Score: 8





[facility] COWS





- Scale


Resting Pulse: 1= [place] 


Sweatin= No chills or Flushing


Restless Observation: 1= Difficult to Sit Still


Pupil Size: 1= Pupils >than Normal


Bone or Joint Aches: 1= Mild Discomfort


Runny Nose/ Eye Tearin= Nasal Congestion


GI Upset > 30mins: 1= Stomach Cramp


Tremor Observation of Outstretched Hands: 1= Tremor Felt, Not Seen


Yawning Observation: 1= 1-2x During Session


Anxiety or Irritability: 2=Irritable/Anxious


Goose Flesh Skin: 0=Smooth Skin


COWS Score: 10





[facility] Progress Note (SOAP)


Subjective: 





alert,irritable,anxious,pain in the body and back,tremor


Objective: 





10/25/19 10:16


 Vital Signs











Temperature  97.9 F   10/25/19 09:20


 


Pulse Rate  92 H  10/25/19 09:20


 


Respiratory Rate  18   10/25/19 09:20


 


Blood Pressure  112/77   10/25/19 09:20


 


O2 Sat by Pulse Oximetry (%)      








labs pending


Assessment: 





10/25/19 10:16


withdrawal symptom


Plan: 





continue detox,methadone and librium regimen

## 2019-10-26 RX ADMIN — Medication PRN MG: at 21:44

## 2019-10-26 RX ADMIN — Medication SCH MG: at 21:44

## 2019-10-26 RX ADMIN — NICOTINE SCH MG: 14 PATCH, EXTENDED RELEASE TRANSDERMAL at 10:23

## 2019-10-26 RX ADMIN — Medication SCH TAB: at 10:23

## 2019-10-26 RX ADMIN — HYDROXYZINE PAMOATE PRN MG: 25 CAPSULE ORAL at 15:16

## 2019-10-26 NOTE — PN
S CIWA





- CIWA Score


Nausea/Vomitin-No Nausea/No Vomiting


Muscle Tremors: 2


Anxiety: 3


Agitation: 0-Normal Activity


Paroxysmal Sweats: 3


Orientation: 0-Oriented


Tacttile Disturbances: 0-None


Auditory Disturbances: 0-None


Visual Disturbances: 0-None


Headache: 2-Mild


CIWA-Ar Total Score: 10





[facility] COWS





- Scale


Resting Pulse: 0= MA 80 or Below


Sweating: 3= Beads of Sweat on Face


Restless Observation: 1= Difficult to Sit Still


Pupil Size: 0= Normal to Room Light


Bone or Joint Aches: 2= Severe Diffuse Aches


Runny Nose/ Eye Tearin= None


GI Upset > 30mins: 0= None


Tremor Observation of Outstretched Hands: 0= None


Yawning Observation: 1= 1-2x During Session


Anxiety or Irritability: 2=Irritable/Anxious


Goose Flesh Skin: 0=Smooth Skin


COWS Score: 9





[facility] Progress Note (SOAP)


Subjective: 





c/o anxiety, irritability, headache, and sweats.


Objective: 





10/26/19 11:00


 Vital Signs











  10/26/19





  06:21


 


Temperature 97.9 F


 


Pulse Rate 75


 


Respiratory 16





Rate 


 


Blood Pressure 138/87








 Lab Results











WBC  8.8 K/mm3 (4.0-10.0)   10/25/19  07:20    


 


RBC  4.22 M/mm3 (4.00-5.60)   10/25/19  07:20    


 


Hgb  13.6 GM/dL (11.7-16.9)   10/25/19  07:20    


 


Hct  41.1 % (35.4-49)   10/25/19  07:20    


 


MCV  97.4 fl (80-96)  H  10/25/19  07:20    


 


MCHC  33.1 g/dl (32.0-35.9)   10/25/19  07:20    


 


RDW  14.9 % (11.9-15.9)   10/25/19  07:20    


 


Plt Count  223 K/MM3 (134-434)   10/25/19  07:20    


 


Sodium  143 mmol/L (136-145)   10/25/19  07:20    


 


Potassium  4.5 mmol/L (3.5-5.1)   10/25/19  07:20    


 


Chloride  109 mmol/L ()  H  10/25/19  07:20    


 


Carbon Dioxide  28 mmol/L (21-32)   10/25/19  07:20    


 


Anion Gap  6 MMOL/L (8-16)  L  10/25/19  07:20    


 


BUN  10.4 mg/dL (7-18)   10/25/19  07:20    


 


Creatinine  0.7 mg/dL (0.55-1.3)   10/25/19  07:20    


 


Random Glucose  97 mg/dL ()   10/25/19  07:20    


 


Calcium  8.8 mg/dL (8.5-10.1)   10/25/19  07:20    








Labs noted.


Assessment: 





10/26/19 11:00


AOX3, in no acute respiratory distress.


Full ROM, ambulating in the unit.


withdrawal symptoms.


Plan: 





continue detox.

## 2019-10-27 RX ADMIN — NICOTINE SCH MG: 14 PATCH, EXTENDED RELEASE TRANSDERMAL at 10:24

## 2019-10-27 RX ADMIN — Medication SCH MG: at 22:24

## 2019-10-27 RX ADMIN — Medication SCH TAB: at 10:24

## 2019-10-27 NOTE — PN
S CIWA





- CIWA Score


Nausea/Vomitin-No Nausea/No Vomiting


Muscle Tremors: None


Anxiety: 3


Agitation: 2


Paroxysmal Sweats: 2


Orientation: 0-Oriented


Tacttile Disturbances: 0-None


Auditory Disturbances: 0-None


Visual Disturbances: 0-None


Headache: 0-None Present


CIWA-Ar Total Score: 7





[facility] COWS





- Scale


Resting Pulse: 0= [place] 80 or Below


Sweatin= No chills or Flushing


Restless Observation: 0= Sits Still


Pupil Size: 0= Normal to Room Light


Bone or Joint Aches: 1= Mild Discomfort


Runny Nose/ Eye Tearin= None


GI Upset > 30mins: 1= Stomach Cramp


Tremor Observation of Outstretched Hands: 0= None


Yawning Observation: 1= 1-2x During Session


Anxiety or Irritability: 2=Irritable/Anxious


Goose Flesh Skin: 0=Smooth Skin


COWS Score: 5





[facility] Progress Note (SOAP)


Subjective: 





Feels ok, medication is working


Objective: 





10/27/19 12:23


 Last Vital Signs











Temp Pulse Resp BP Pulse Ox


 


 98.1 F   80   16   122/74    


 


 10/27/19 09:36  10/27/19 09:36  10/27/19 09:36  10/27/19 09:36   








 Laboratory Tests











  10/25/19 10/25/19 10/25/19





  07:20 07:20 07:20


 


WBC  8.8  


 


RBC  4.22  


 


Hgb  13.6  


 


Hct  41.1  


 


MCV  97.4 H  


 


MCH  32.2  


 


MCHC  33.1  


 


RDW  14.9  


 


Plt Count  223  


 


MPV  9.4  


 


Sodium   143 


 


Potassium   4.5 


 


Chloride   109 H 


 


Carbon Dioxide   28 


 


Anion Gap   6 L 


 


BUN   10.4 


 


Creatinine   0.7 


 


Est GFR (CKD-EPI)AfAm   122.27 


 


Est GFR (CKD-EPI)NonAf   105.50 


 


Random Glucose   97 


 


Calcium   8.8 


 


Total Bilirubin   0.4 


 


AST   17 


 


ALT   23 


 


Alkaline Phosphatase   72 


 


Total Protein   6.5 


 


Albumin   3.4 


 


RPR Titer    Nonreactive








Labs reviewed


Assessment: 





10/27/19 12:23


Withdrawal sxs


Plan: 





Continue detox


Encouraged PO water intake


Patient scheduled for discharge tomorrow

## 2019-10-28 VITALS — HEART RATE: 83 BPM | SYSTOLIC BLOOD PRESSURE: 116 MMHG | DIASTOLIC BLOOD PRESSURE: 67 MMHG

## 2019-10-28 VITALS — TEMPERATURE: 97.5 F

## 2019-10-28 RX ADMIN — Medication PRN MG: at 00:16

## 2019-10-28 NOTE — PN
[facility] Progress Note


Note: 





patient is taking motrin 800 mgs po q 8 hrs prn and gabapentin 300 mgs po tid 

for low back pain,


will be discharged with these medications for 10 days supplied

## 2019-10-28 NOTE — PN
[facility] CIWA





- CIWA Score


Nausea/Vomitin-No Nausea/No Vomiting


Muscle Tremors: 1-None Visible, but Felt


Anxiety: 1-Mildly Anxious


Agitation: 1-Slight > Activity


Paroxysmal Sweats: No Perspiration


Orientation: 0-Oriented


Tacttile Disturbances: 0-None


Auditory Disturbances: 0-None


Visual Disturbances: 0-None


Headache: 0-None Present


CIWA-Ar Total Score: 3





[facility] COWS





- Scale


Resting Pulse: 1= [place] 


Sweatin= No chills or Flushing


Restless Observation: 0= Sits Still


Pupil Size: 0= Normal to Room Light


Bone or Joint Aches: 1= Mild Discomfort


Runny Nose/ Eye Tearin= None


GI Upset > 30mins: 0= None


Tremor Observation of Outstretched Hands: 0= None


Yawning Observation: 0= None


Anxiety or Irritability: 0= None


Goose Flesh Skin: 0=Smooth Skin


COWS Score: 2





[facility] Progress Note (SOAP)


Subjective: 





alert,no complaint,


Objective: 





10/28/19 11:16


 Vital Signs











Temperature  97.5 F L  10/28/19 09:36


 


Pulse Rate  83   10/28/19 09:36


 


Respiratory Rate  16   10/28/19 09:36


 


Blood Pressure  116/67   10/28/19 09:36


 


O2 Sat by Pulse Oximetry (%)      











Assessment: 





10/28/19 11:16


detox completed,no withdrawal symptom


Plan: 





stable for discharge today,follow up with after care program as arrangement

## 2019-10-28 NOTE — DS
[facility] Detox Discharge Summary


Admission Date: 


10/24/19





Discharge Date: 10/28/19





- History


Present History: Alcohol Dependence, Opioid Dependence


Additional Comments: 





follow up with after care program as arrangement


Pertinent Past History: 





nicotine dependence


low back pain





- Physical Exam Results


Vital Signs: 


 Vital Signs











Temperature  97.5 F L  10/28/19 09:36


 


Pulse Rate  83   10/28/19 09:36


 


Respiratory Rate  16   10/28/19 09:36


 


Blood Pressure  116/67   10/28/19 09:36


 


O2 Sat by Pulse Oximetry (%)      











Pertinent Admission Physical Exam Findings: 





withdrawal signs and symptom


 Vital Signs











Temperature  97.5 F L  10/28/19 09:36


 


Pulse Rate  83   10/28/19 09:36


 


Respiratory Rate  16   10/28/19 09:36


 


Blood Pressure  116/67   10/28/19 09:36


 


O2 Sat by Pulse Oximetry (%)      








 Laboratory Last Values











WBC  8.8 K/mm3 (4.0-10.0)   10/25/19  07:20    


 


RBC  4.22 M/mm3 (4.00-5.60)   10/25/19  07:20    


 


Hgb  13.6 GM/dL (11.7-16.9)   10/25/19  07:20    


 


Hct  41.1 % (35.4-49)   10/25/19  07:20    


 


MCV  97.4 fl (80-96)  H  10/25/19  07:20    


 


MCH  32.2 pg (25.7-33.7)   10/25/19  07:20    


 


MCHC  33.1 g/dl (32.0-35.9)   10/25/19  07:20    


 


RDW  14.9 % (11.9-15.9)   10/25/19  07:20    


 


Plt Count  223 K/MM3 (134-434)   10/25/19  07:20    


 


MPV  9.4 fl (7.5-11.1)   10/25/19  07:20    


 


Sodium  143 mmol/L (136-145)   10/25/19  07:20    


 


Potassium  4.5 mmol/L (3.5-5.1)   10/25/19  07:20    


 


Chloride  109 mmol/L ()  H  10/25/19  07:20    


 


Carbon Dioxide  28 mmol/L (21-32)   10/25/19  07:20    


 


Anion Gap  6 MMOL/L (8-16)  L  10/25/19  07:20    


 


BUN  10.4 mg/dL (7-18)   10/25/19  07:20    


 


Creatinine  0.7 mg/dL (0.55-1.3)   10/25/19  07:20    


 


Est GFR (CKD-EPI)AfAm  122.27   10/25/19  07:20    


 


Est GFR (CKD-EPI)NonAf  105.50   10/25/19  07:20    


 


Random Glucose  97 mg/dL ()   10/25/19  07:20    


 


Calcium  8.8 mg/dL (8.5-10.1)   10/25/19  07:20    


 


Total Bilirubin  0.4 mg/dL (0.2-1)   10/25/19  07:20    


 


AST  17 U/L (15-37)   10/25/19  07:20    


 


ALT  23 U/L (13-61)   10/25/19  07:20    


 


Alkaline Phosphatase  72 U/L ()   10/25/19  07:20    


 


Total Protein  6.5 g/dl (6.4-8.2)   10/25/19  07:20    


 


Albumin  3.4 g/dl (3.4-5.0)   10/25/19  07:20    


 


RPR Titer  Nonreactive  (NONREACTIVE)   10/25/19  07:20    














- Treatment


Hospital Course: Detox Protocol Followed, Detoxed Safely, Responded well, 

Discharged Condition Good





- Medication


Discharge Medications: 


Ambulatory Orders





NK [No Known Home Medication]  10/24/19